# Patient Record
Sex: FEMALE | Race: WHITE | NOT HISPANIC OR LATINO | Employment: UNEMPLOYED | URBAN - METROPOLITAN AREA
[De-identification: names, ages, dates, MRNs, and addresses within clinical notes are randomized per-mention and may not be internally consistent; named-entity substitution may affect disease eponyms.]

---

## 2017-01-10 DIAGNOSIS — R56.9 CONVULSIONS, UNSPECIFIED CONVULSION TYPE (H): ICD-10-CM

## 2017-01-10 DIAGNOSIS — R56.9 SEIZURES (H): Primary | ICD-10-CM

## 2017-01-10 RX ORDER — LEVETIRACETAM 750 MG/1
TABLET ORAL
Qty: 180 TABLET | Refills: 0 | Status: SHIPPED | OUTPATIENT
Start: 2017-01-10 | End: 2018-03-20

## 2017-01-26 ENCOUNTER — OFFICE VISIT (OUTPATIENT)
Dept: NEUROLOGY | Facility: CLINIC | Age: 37
End: 2017-01-26

## 2017-01-26 VITALS — RESPIRATION RATE: 18 BRPM | HEART RATE: 107 BPM | SYSTOLIC BLOOD PRESSURE: 123 MMHG | DIASTOLIC BLOOD PRESSURE: 86 MMHG

## 2017-01-26 DIAGNOSIS — R56.9 CONVULSIONS, UNSPECIFIED CONVULSION TYPE (H): Primary | ICD-10-CM

## 2017-01-26 RX ORDER — LEVETIRACETAM 750 MG/1
750 TABLET ORAL 2 TIMES DAILY
Qty: 60 TABLET | Refills: 12 | Status: SHIPPED | OUTPATIENT
Start: 2017-01-26 | End: 2018-02-14

## 2017-01-26 ASSESSMENT — PAIN SCALES - GENERAL: PAINLEVEL: NO PAIN (0)

## 2017-01-26 NOTE — MR AVS SNAPSHOT
After Visit Summary   2017    Gabriela Juarez    MRN: [id number]           Patient Information     Date Of Birth          1980        Visit Information        Provider Department      2017 1:30 PM Juan Jackson MD [facility]        Today's Diagnoses     Convulsions, unspecified convulsion type (H)    -  1        Follow-ups after your visit        Follow-up notes from your care team     Return in about 1 year (around 2018).      Who to contact     Please call your clinic at [phone] to:    Ask questions about your health    Make or cancel appointments    Discuss your medicines    Learn about your test results    Speak to your doctor   If you have compliments or concerns about an experience at your clinic, or if you wish to file a complaint, please contact [facility] Patient Relations at [phone] or email us at [email or web address]         Additional Information About Your Visit        MyChart Information     mySocietyt is an electronic gateway that provides easy, online access to your medical records. With [employer], you can request a clinic appointment, read your test results, renew a prescription or communicate with your care team.     To sign up for mySocietyt visit the website at [email or web address]   You will be asked to enter the access code listed below, as well as some personal information. Please follow the directions to create your username and password.     Your access code is: [email or web address]  Expires: 2017  1:36 PM     Your access code will  in 90 days. If you need help or a new code, please contact your [facility] or call [phone] for assistance.        Care EveryWhere ID     This is your Care EveryWhere ID. This could be used by other organizations to access your [place] medical records  [id number]        Your Vitals Were      Pulse Respirations                107 18           Blood Pressure from Last 3 Encounters:   01/26/17 123/86   12/01/14 126/84   12/23/13 118/82    Weight from Last 3 Encounters:   No data found for Wt              Today, you had the following     No orders found for display         Where to get your medicines      These medications were sent to [facility] - [place] - [address]  [address] [facility], [place] [address]     Phone:  [phone]    - levETIRAcetam 750 MG tablet       Primary Care Provider    Melvina Balbuena       No address on file        Thank you!     Thank you for choosing [facility]  for your care. Our goal is always to provide you with excellent care. Hearing back from our patients is one way we can continue to improve our services. Please take a few minutes to complete the written survey that you may receive in the mail after your visit with us. Thank you!             Your Updated Medication List - Protect others around you: Learn how to safely use, store and throw away your medicines at www.disposemymeds.org.          This list is accurate as of: 1/26/17  1:36 PM.  Always use your most recent med list.                   Brand Name Dispense Instructions for use    B-12 PO      Take by mouth daily       calcium carbonate 500 MG tablet    OS-SHARMIN 500 mg [place]. Ca     Take 500 mg by mouth daily       CLOZAPINE PO    CLOZARIL     Take by mouth daily 150 am, 200 pm, 250 HS       * levETIRAcetam 750 MG tablet    KEPPRA    180 tablet    TAKE ONE (1) tablet BY MOUTH TWICE DAILY       * levETIRAcetam 750 MG tablet    KEPPRA    60 tablet    Take 1 tablet (750 mg) by mouth 2 times daily       lithium 300 MG capsule      Take 100 mg by mouth 3 times daily (with meals)       * Notice:  This list has 2 medication(s) that are the same as other medications prescribed for you. Read the directions carefully, and ask your  doctor or other care provider to review them with you.

## 2017-01-26 NOTE — Clinical Note
2017       RE: Gabriela Gill  [address]  [place] [address]     Dear Colleague,    Thank you for referring your patient, Gabriela Gill, to the [facility] at [facility]. Please see a copy of my visit note below.    2017      Funmilayo Balbuena NP   [facility]    [address] [place]   [address]       RE: Gabriela Gill   MRN: [id number]   : 1980      Dear Funmilayo:      I am writing to you in followup on Gabriela Gill with chief complaint of seizure disorder.  I last saw her in 2015.  She is here with her mother, Antonietta.  There has been no major change in her neurologic status since that time.  The patient is on Keppra 750 mg twice a day.  She also is on lithium and Clozaril.  She lives at [place].  She is a .  She loves [employer].  She is swimming now.  She also participates in other sporting activities.  She has no complaints.      On exam today, the patient is cooperative and in no distress.  Her blood pressure is 123/86.  There is nothing to add on neurologic exam.      ASSESSMENT:   1.  Seizure disorder, well controlled with Keppra.   2.  Psychiatric issues.   3.  Chronic encephalopathy.      DISCUSSION:  The patient is seen with the above problem list.  I am not going to make any changes in her regimen.  She gets a CBC checked every month by Dr. Spaulding because of her Clozaril use.  I have given her a refill on the Keppra.  I will see her in followup in a year.      Sincerely,       Juan Jackson MD              D: 2017 13:39   T: 2017 14:49   MT: ISELA      Name:     GABRIELA GILL   MRN:      [id number]        Account:      [id number]   :      1980           Service Date: 2017      Document: [id number]

## 2018-02-14 DIAGNOSIS — R56.9 CONVULSIONS, UNSPECIFIED CONVULSION TYPE (H): ICD-10-CM

## 2018-02-14 RX ORDER — LEVETIRACETAM 750 MG/1
TABLET ORAL
Qty: 60 TABLET | Refills: 0 | Status: SHIPPED | OUTPATIENT
Start: 2018-02-14 | End: 2018-03-20

## 2018-03-15 DIAGNOSIS — R56.9 CONVULSIONS, UNSPECIFIED CONVULSION TYPE (H): ICD-10-CM

## 2018-03-16 RX ORDER — LEVETIRACETAM 750 MG/1
TABLET ORAL
Refills: 0 | OUTPATIENT
Start: 2018-03-16

## 2018-03-20 RX ORDER — LEVETIRACETAM 750 MG/1
750 TABLET ORAL 2 TIMES DAILY
Qty: 60 TABLET | Refills: 0 | Status: SHIPPED | OUTPATIENT
Start: 2018-03-20 | End: 2018-04-10

## 2018-03-20 NOTE — TELEPHONE ENCOUNTER
----- Message from Norma Lozano sent at 3/20/2018  9:46 AM CDT -----  Regarding: Pts caregiver calling for refill of rx LevETIRAcetam 750 MG  Contact: [phone]  Pts caregiver Harrison calling for refill of rx LevETIRAcetam 750 MG. There was a request that was submitted on 3/15 but was denied due to pt not having an appt. Kushal made appt for pt on 4/10 but states that pt ran out of medication this morning and is hoping to get a refill of enough to get pt through until then.  Per Kushal, pt would appreciate a call back when the request has been submitted at [phone].    Thank you!  ~Norma    Please DO NOT send this message and/or reply back to sender. Call Center Representatives DO NOT respond to messages.

## 2018-04-10 ENCOUNTER — OFFICE VISIT (OUTPATIENT)
Dept: NEUROLOGY | Facility: CLINIC | Age: 38
End: 2018-04-10
Payer: MEDICARE

## 2018-04-10 VITALS
DIASTOLIC BLOOD PRESSURE: 80 MMHG | SYSTOLIC BLOOD PRESSURE: 125 MMHG | WEIGHT: 158.2 LBS | HEART RATE: 103 BPM | OXYGEN SATURATION: 99 %

## 2018-04-10 DIAGNOSIS — R56.9 CONVULSIONS, UNSPECIFIED CONVULSION TYPE (H): ICD-10-CM

## 2018-04-10 PROCEDURE — 99213 OFFICE O/P EST LOW 20 MIN: CPT | Performed by: PSYCHIATRY & NEUROLOGY

## 2018-04-10 RX ORDER — LEVETIRACETAM 750 MG/1
750 TABLET ORAL 2 TIMES DAILY
Qty: 60 TABLET | Refills: 11 | Status: SHIPPED | OUTPATIENT
Start: 2018-04-10 | End: 2019-04-24

## 2018-04-10 ASSESSMENT — PAIN SCALES - GENERAL: PAINLEVEL: NO PAIN (0)

## 2018-04-10 NOTE — NURSING NOTE
Gabriela Juarez's goals for this visit include: return  She requests these members of her care team be copied on today's visit information:     PCP: Melvina Balbuena    Referring Provider:  Juan Jackson MD  [address] [place] [address] [address]  [place] [address]    Chief Complaint   Patient presents with     RECHECK       Initial /80  Pulse 103  Wt 71.8 kg (158 lb 3.2 oz)  SpO2 99% There is no height or weight on file to calculate BMI.  Medication Reconciliation: complete    Do you need any medication refills at today's visit? ?

## 2018-04-10 NOTE — LETTER
4/10/2018         RE: Gabriela Gill  [address]  [place] [address]        Dear Colleague,    Thank you for referring your patient, Gabriela Gill, to the [facility]. Please see a copy of my visit note below.    Visit Date:   04/10/2018      SUBJECTIVE:  This patient is seen in followup with seizure disorder.  She is here today with her , Raymundo.  Usually, she comes with one of her parents.  In any case, I last saw the patient a little over a year ago.  She has seizure disorder not further characterized.  She has been treating with levetiracetam 750 mg twice a day.  She is doing well.  She has not had seizures in several years.  She also is on lithium and clozapine.  She lives at [place], which is near [facility] in [place].  She works as a .  She is also involved in the [employer] with walking and swimming.  They swim at the high school.  She has had a good year.  She has no complaints.      PHYSICAL EXAMINATION:   GENERAL:  The patient is cooperative and in no distress.   VITAL SIGNS:  Her blood pressure is 125/80.     Eye movements are complete and conjugate without nystagmus.  Finger-nose-finger and heel-knee-shin are normal.  She can get out of a chair without the use of her arms.  She cannot walk on her heels, toes or tandem.      ASSESSMENT:   1.  Seizure disorder.   2.  Chronic encephalopathy with psychiatric issues.      DISCUSSION:  The patient is seen with the above problem list.  She is doing well on her current regimen of levetiracetam.  I have given her a refill for 1 year.  I will see her in followup at that time.  Monthly blood counts are checked because of her use of clozapine by Dr. Spaulding.         WILLIE FITZGERALD MD             D: 04/10/2018   T: 04/10/2018   MT: RAIZA      Name:     GABRIELA GILL   MRN:      [id number]        Account:      [id number]   :      1980           Visit Date:   04/10/2018      Document: [id number]        cc: Funmilaoy Balbuena NP      Again, thank you for allowing me to participate in the care of your patient.        Sincerely,        Juan Jackson MD

## 2018-04-10 NOTE — PROGRESS NOTES
Visit Date:   04/10/2018      SUBJECTIVE:  This patient is seen in followup with seizure disorder.  She is here today with her , Raymundo.  Usually, she comes with one of her parents.  In any case, I last saw the patient a little over a year ago.  She has seizure disorder not further characterized.  She has been treating with levetiracetam 750 mg twice a day.  She is doing well.  She has not had seizures in several years.  She also is on lithium and clozapine.  She lives at [place], which is near [facility].  She works as a .  She is also involved in the [employer] with walking and swimming.  They swim at the high school.  She has had a good year.  She has no complaints.      PHYSICAL EXAMINATION:   GENERAL:  The patient is cooperative and in no distress.   VITAL SIGNS:  Her blood pressure is 125/80.     Eye movements are complete and conjugate without nystagmus.  Finger-nose-finger and heel-knee-shin are normal.  She can get out of a chair without the use of her arms.  She cannot walk on her heels, toes or tandem.      ASSESSMENT:   1.  Seizure disorder.   2.  Chronic encephalopathy with psychiatric issues.      DISCUSSION:  The patient is seen with the above problem list.  She is doing well on her current regimen of levetiracetam.  I have given her a refill for 1 year.  I will see her in followup at that time.  Monthly blood counts are checked because of her use of clozapine by Dr. Spaulding.         WILLIE FITZGERALD MD             D: 04/10/2018   T: 04/10/2018   MT: RAIZA      Name:     DAVID GILL   MRN:      [id number]        Account:      [id number]   :      1980           Visit Date:   04/10/2018      Document: [id number]       cc: Funmilayo Balbuena NP

## 2018-04-10 NOTE — MR AVS SNAPSHOT
After Visit Summary   4/10/2018    Gabriela Juarez    MRN: [id number]           Patient Information     Date Of Birth          1980        Visit Information        Provider Department      4/10/2018 1:30 PM Juan Jackson MD [facility]        Today's Diagnoses     Convulsions, unspecified convulsion type (H)           Follow-ups after your visit        Follow-up notes from your care team     Return in about 1 year (around 4/10/2019).      Your next 10 appointments already scheduled     Apr 10, 2019  2:00 PM CDT   Return Visit with Juan Jackson MD   [facility]    [address] [place] [id number] [phone]              Who to contact     If you have questions or need follow up information about today's clinic visit or your schedule please contact [facility] directly at [phone].  Normal or non-critical lab and imaging results will be communicated to you by MyChart, letter or phone within 4 business days after the clinic has received the results. If you do not hear from us within 7 days, please contact the clinic through [employer] or phone. If you have a critical or abnormal lab result, we will notify you by phone as soon as possible.  Submit refill requests through [employer] or call your pharmacy and they will forward the refill request to us. Please allow 3 business days for your refill to be completed.          Additional Information About Your Visit        MyChart Information     [employer] is an electronic gateway that provides easy, online access to your medical records. With [employer], you can request a clinic appointment, read your test results, renew a prescription or communicate with your care team.     To sign up for [employer] visit the website at [email or web address]   You will be asked to enter the access code listed below, as well as some personal information. Please  follow the directions to create your username and password.     Your access code is: [email or web address]  Expires: 2018  2:00 PM     Your access code will  in 90 days. If you need help or a new code, please contact your [facility] or call [phone] for assistance.        Care EveryWhere ID     This is your Care EveryWhere ID. This could be used by other organizations to access your [place] medical records  [id number]        Your Vitals Were     Pulse Pulse Oximetry                103 99%           Blood Pressure from Last 3 Encounters:   04/10/18 125/80   17 123/86   14 126/84    Weight from Last 3 Encounters:   04/10/18 71.8 kg (158 lb 3.2 oz)              Today, you had the following     No orders found for display         Where to get your medicines      These medications were sent to [facility] - [place], [place] - [address]  [facility] [address], [facility] [address]     Phone:  [phone]     levETIRAcetam 750 MG tablet          Primary Care Provider    Melvina Balbuena       No address on file        Equal Access to Services     [facility] Center[facility]UDE : Hadii aad ku hadasho Soomaali, waaxda luqadaha, qaybta kaalmada adeegyada, cassidy hawkins haymarvin guerrero . [facility] [phone].    ATENCIÓN: Si habla español, tiene a crow disposición servicios gratuitos de asistencia lingüística. [facility] [phone].    We comply with applicable federal civil rights laws and [place] laws. We do not discriminate on the basis of race, color, national origin, age, disability, sex, sexual orientation, or gender identity.            Thank you!     Thank you for choosing [facility]  for your care. Our goal is always to provide you with excellent care. Hearing back from our patients is one way we can continue to improve our services. Please take a few minutes to complete the written survey that you may receive in the mail after your visit with us.  Thank you!             Your Updated Medication List - Protect others around you: Learn how to safely use, store and throw away your medicines at www.disposemymeds.org.          This list is accurate as of 4/10/18  2:00 PM.  Always use your most recent med list.                   Brand Name Dispense Instructions for use Diagnosis    B-12 PO      Take by mouth daily        calcium carbonate 1250 MG tablet    OS-SHARMIN 500 mg [place]. Ca     Take 500 mg by mouth daily        CLOZAPINE PO    CLOZARIL     Take 100 mg by mouth daily 1 every am ,2 tabs at 4:30 and 2 tabs at hs        FLOVENT IN      Inhale 1 puff into the lungs 2 times daily        levETIRAcetam 750 MG tablet    KEPPRA    60 tablet    Take 1 tablet (750 mg) by mouth 2 times daily    Convulsions, unspecified convulsion type (H)       LITHIUM PO      Take 100 mg by mouth 3 times daily

## 2019-04-24 ENCOUNTER — OFFICE VISIT (OUTPATIENT)
Dept: NEUROLOGY | Facility: CLINIC | Age: 39
End: 2019-04-24
Payer: MEDICARE

## 2019-04-24 VITALS
HEART RATE: 95 BPM | TEMPERATURE: 97.6 F | WEIGHT: 155.3 LBS | SYSTOLIC BLOOD PRESSURE: 141 MMHG | DIASTOLIC BLOOD PRESSURE: 85 MMHG | RESPIRATION RATE: 18 BRPM | OXYGEN SATURATION: 99 %

## 2019-04-24 DIAGNOSIS — R56.9 CONVULSIONS, UNSPECIFIED CONVULSION TYPE (H): ICD-10-CM

## 2019-04-24 PROCEDURE — 99213 OFFICE O/P EST LOW 20 MIN: CPT | Performed by: PSYCHIATRY & NEUROLOGY

## 2019-04-24 RX ORDER — LEVETIRACETAM 750 MG/1
750 TABLET ORAL 2 TIMES DAILY
Qty: 60 TABLET | Refills: 11 | Status: SHIPPED | OUTPATIENT
Start: 2019-04-24 | End: 2020-03-16

## 2019-04-24 ASSESSMENT — PAIN SCALES - GENERAL: PAINLEVEL: NO PAIN (0)

## 2019-04-24 NOTE — PROGRESS NOTES
Visit Date:   2019      NEUROLOGY CONSULTATION    HISTORY OF PRESENT ILLNESS:  This patient is seen in followup with seizure disorder and chronic encephalopathy.  She is here with her , Anushka.  I last saw the patient a year ago.  There has been no major change in her neurologic status.  She has not had any seizures.  She continues to work at [employer].  She is a  and also helps out in the lunch room.  She walks for exercise when the weather is a pleasant.  She swims on Sundays.  She also does [employer] track and field.  She has no complaints.  There has been no major change in her neurologic status.      PHYSICAL EXAMINATION:   GENERAL:  The patient is cooperative and in no distress.   VITAL SIGNS:  Her blood pressure is 141/85.   NEUROLOGIC:  She easily ambulates about the room but cannot walk on her heels, toes or tandem due to issues with imbalance.     ASSESSMENT: Seizure disorder; chronic encephalopathy     She does get labs performed periodically.  A CBC performed within the last month was normal, as well as a liver panel.  These labs are routinely checked by Psychiatry because of her use of clozapine.      Refill for levetiracetam was given for 1 year.  She will be seen in followup at that time.  They know to call if there are questions or problems.         WILLIE FITZGERALD MD             D: 2019   T: 2019   MT: AS      Name:     DAVID GILL   MRN:      [id number]        Account:      [id number]   :      1980           Visit Date:   2019      Document: [id number]

## 2019-04-24 NOTE — NURSING NOTE
Gabriela Juarez's goals for this visit include:   Chief Complaint   Patient presents with     RECHECK     1 yr recheck       She requests these members of her care team be copied on today's visit information: Yes    PCP: Melvina Balbuena    Referring Provider:  Juan Jackson MD  [address] [facility] [address]  [place] [address]    /85 (BP Location: Left arm, Patient Position: Sitting, Cuff Size: Adult Large)   Pulse 95   Temp 97.6  F (36.4  C) (Oral)   Resp 18   Wt 70.4 kg (155 lb 4.8 oz)   SpO2 99%     Do you need any medication refills at today's visit? Yes    Jersey Duarte CMA ([facility])

## 2020-03-16 DIAGNOSIS — R56.9 CONVULSIONS, UNSPECIFIED CONVULSION TYPE (H): ICD-10-CM

## 2020-03-16 RX ORDER — LEVETIRACETAM 750 MG/1
750 TABLET ORAL 2 TIMES DAILY
Qty: 60 TABLET | Refills: 11 | Status: SHIPPED | OUTPATIENT
Start: 2020-03-16 | End: 2020-05-07

## 2020-04-20 ENCOUNTER — TELEPHONE (OUTPATIENT)
Dept: NEUROLOGY | Facility: CLINIC | Age: 40
End: 2020-04-20

## 2020-05-07 ENCOUNTER — VIRTUAL VISIT (OUTPATIENT)
Dept: NEUROLOGY | Facility: CLINIC | Age: 40
End: 2020-05-07
Payer: MEDICARE

## 2020-05-07 DIAGNOSIS — R56.9 CONVULSIONS, UNSPECIFIED CONVULSION TYPE (H): ICD-10-CM

## 2020-05-07 PROCEDURE — 99207 ZZC NO CHARGE LOS: CPT | Performed by: PSYCHIATRY & NEUROLOGY

## 2020-05-07 RX ORDER — LEVETIRACETAM 750 MG/1
750 TABLET ORAL 2 TIMES DAILY
Qty: 60 TABLET | Refills: 11 | Status: SHIPPED | OUTPATIENT
Start: 2020-05-07 | End: 2021-04-21

## 2020-05-07 NOTE — PROGRESS NOTES
Visit Date:   2020      This is a telephone followup visit because of the virus epidemic.  The call was mostly handled by her , Anushka.        HISTORY OF PRESENT ILLNESS:  The patient was last seen a year ago.  She has a history of seizures.  She has been treated with levetiracetam for many years.  Her current dose is 750 mg twice a day.  She has been on this dose for several years.  A levetiracetam level was checked in 2016 on this current dose and it was 44 (12-46).      She is doing well.  Her attitude and mood are excellent.  She is more active than she has been.  There are no complaints.  She has not had any seizures.      PHYSICAL EXAMINATION:  There is no examination because of this being a telephone visit.      ASSESSMENT:   1.  Seizure disorder, quiescent with Keppra.   2.  Chronic encephalopathy.   3.  Psychiatric issues.      DISCUSSION:  The patient had a followup visit with the above problem list.  A refill on levetiracetam was given for a year.  Labs have been checked through her primary clinic.  A CBC in March showed a slightly low red blood count with a hemoglobin of 11 and a normal white blood count.  A liver panel in 2019 showed a normal ALT and bilirubin.  Followup will be in 1 year, or sooner if there are problems.         WILLIE FITZGERALD MD             D: 2020   T: 2020   MT: TERRI      Name:     DAVID GILL   MRN:      [id number]        Account:      [id number]   :      1980           Visit Date:   2020      Document: [id number]

## 2021-01-28 ENCOUNTER — VIRTUAL VISIT (OUTPATIENT)
Dept: NEUROLOGY | Facility: CLINIC | Age: 41
End: 2021-01-28
Payer: MEDICARE

## 2021-01-28 DIAGNOSIS — R56.9 CONVULSIONS, UNSPECIFIED CONVULSION TYPE (H): Primary | ICD-10-CM

## 2021-01-28 PROCEDURE — 99215 OFFICE O/P EST HI 40 MIN: CPT | Mod: 95 | Performed by: INTERNAL MEDICINE

## 2021-01-28 NOTE — PROGRESS NOTES
Gabriela is a 40 year old who is being evaluated via a billable video visit.      How would you like to obtain your AVS? MyChart  If the video visit is dropped, the invitation should be resent by: Send to e-mail at: No e-mail address on record  Will anyone else be joining your video visit? No      Video Start Time: 9:31 AM    Video-Visit Details    Type of service:  Video Visit    Video End Time: 9:43 AM    Originating Location (pt. Location): Home    Distant Location (provider location):  [facility] [place]     Platform used for Video Visit: LilaBrandBacker

## 2021-01-28 NOTE — PROGRESS NOTES
[facility] Neurology Follow-up Visit    Gabriela Juarez MRN# [id number]   Age: 40 year old YOB: 1980     Requesting physician: No ref. provider found  Melvina Balbuena     Reason for Consultation: seizures      History of Presenting Symptoms:   Gabriela Juarez is a 40 year old female who presents today for follow-up of epilepsy.     Patient is accompanied by house leader Lewis today. Patient previously followed with Dr Jackson. She was last seen in 5/2020.     Patient has longstanding history of epilepsy. History is limited due to patient's chronic cognitive deficits and lack of collateral history. House leader Joshua has only known patient for the last 2 months. Patient continues on Keppra 750 mg BID. She hasn't had any seizures since last visit. Patient can't remember when the last seizure she had was, but she think it has been more than 10 years. She is unsure when the seizures first started. She thinks some of her seizures in the past involved facial twitching. She is unsure if she has had a seizure in the past where she lost consciousness with generalized shaking.     Earliest documentation from Dr Jackson that is available is from 2018. At that visit she was also on Keppra 750 mg BID. No seizures have been documented since that visit.       Past Medical History:     Patient Active Problem List   Diagnosis     Seizures (H)     No past medical history on file.     Past Surgical History:   No past surgical history on file.     Social History:     Social History     Tobacco Use     Smoking status: Never Smoker     Smokeless tobacco: Never Used   Substance Use Topics     Alcohol use: No     Drug use: Not on file        Family History:   No family history on file.     Medications:     Current Outpatient Medications   Medication Sig     calcium carbonate (OS-SHARMIN 500 MG [place]. [place]) 500 MG tablet Take 500 mg by mouth daily     CLOZAPINE PO Take 100 mg by mouth daily 1 every am ,2 tabs at 4:30 and 2 tabs at hs      Cyanocobalamin (B-12 PO) Take by mouth daily     Fluticasone Propionate, Inhal, (FLOVENT IN) Inhale 1 puff into the lungs 2 times daily     levETIRAcetam (KEPPRA) 750 MG tablet Take 1 tablet (750 mg) by mouth 2 times daily     LITHIUM PO Take 100 mg by mouth 3 times daily     magnesium oxide 400 MG CAPS Take 1 capsule by mouth daily     No current facility-administered medications for this visit.         Allergies:   No Known Allergies     Review of Systems:   As above     Physical Exam:   Vitals: There were no vitals taken for this visit.   General: Seated comfortably in no acute distress.  Neurologic:     Mental Status: Fully alert, attentive. Able to count backwards from 10-1. Oriented to person, place, and day of week. Said it was February and the year was 2020. Impaired remote memory and fund of knowledge when asked particular questions about medical history. Language normal, speech clear and fluent, no paraphasic errors.     Cranial Nerves: EOMI with normal smooth pursuit. Facial movements symmetric. Hearing not formally tested but intact to conversation.  No dysarthria.     Motor: No tremors or other abnormal movements observed.      Sensory:Negative Romberg.      Coordination: Finger-nose-finger without dysmetria.     Gait: Normal, steady casual gait.         Data: Pertinent prior to visit   Imaging:  MRI brain 2005   IMPRESSION:    Normal MRI of the brain.     Procedures:  EEG 2007   CONCLUSION: This is an abnormal routine EEG in a 26-year-old female   patient during wakefulness and drowsiness. There were frequent   intermittent bifrontal spikes and slow waves throughout the study which   were not increased in frequency during photic stimulation. This finding is   consistent with a seizure disorder.    Laboratory:  None         Assessment and Plan:   Assessment:  Gabriela Juarez is a 40 year old female who presents today for follow-up of epilepsy. Patient previously followed with Dr Jackson. She was last seen  in 5/2020. Unclear from history when seizures first started, but patient doesn't think she's had a seizure in at least 10 years. Previous EEG from 2007 showed intermittent bifrontal spikes. MRI brain was normal in 2005. She is doing well on Keppra 750 mg BID. We discussed continuing current dose of Keppra and following up in 1 year.     Follow up in Neurology clinic in 1 year or earlier as needed should new concerns arise.    Santosh Seals MD   of Neurology  [facility]    The total time of this encounter amounted to 40 minutes. This time included time spent with the patient, prep work, ordering tests, and performing post visit documentation.

## 2021-01-28 NOTE — LETTER
1/28/2021         RE: Gabriela Juarez  [address] [facility] [address]        Dear Colleague,    Thank you for referring your patient, Gabriela Juarez, to the [facility] NEUROLOGY CLINIC [place]. Please see a copy of my visit note below.    [facility] Neurology Follow-up Visit    Gabriela Juarez MRN# [id number]   Age: 40 year old YOB: 1980     Requesting physician: No ref. provider found  Melvina Balbuena     Reason for Consultation: seizures      History of Presenting Symptoms:   Gabriela Juarez is a 40 year old female who presents today for follow-up of epilepsy.     Patient is accompanied by house leader Lewis today. Patient previously followed with Dr Jackson. She was last seen in 5/2020.     Patient has longstanding history of epilepsy. History is limited due to patient's chronic cognitive deficits and lack of collateral history. House leader Lewis has only known patient for the last 2 months. Patient continues on Keppra 750 mg BID. She hasn't had any seizures since last visit. Patient can't remember when the last seizure she had was, but she think it has been more than 10 years. She is unsure when the seizures first started. She thinks some of her seizures in the past involved facial twitching. She is unsure if she has had a seizure in the past where she lost consciousness with generalized shaking.     Earliest documentation from Dr Jackson that is available is from 2018. At that visit she was also on Keppra 750 mg BID. No seizures have been documented since that visit.       Past Medical History:     Patient Active Problem List   Diagnosis     Seizures (H)     No past medical history on file.     Past Surgical History:   No past surgical history on file.     Social History:     Social History     Tobacco Use     Smoking status: Never Smoker     Smokeless tobacco: Never Used   Substance Use Topics     Alcohol use: No     Drug use: Not on file        Family History:   No family history on  file.     Medications:     Current Outpatient Medications   Medication Sig     calcium carbonate (OS-SHARMIN 500 MG [place]. [place]) 500 MG tablet Take 500 mg by mouth daily     CLOZAPINE PO Take 100 mg by mouth daily 1 every am ,2 tabs at 4:30 and 2 tabs at hs     Cyanocobalamin (B-12 PO) Take by mouth daily     Fluticasone Propionate, Inhal, (FLOVENT IN) Inhale 1 puff into the lungs 2 times daily     levETIRAcetam (KEPPRA) 750 MG tablet Take 1 tablet (750 mg) by mouth 2 times daily     LITHIUM PO Take 100 mg by mouth 3 times daily     magnesium oxide 400 MG CAPS Take 1 capsule by mouth daily     No current facility-administered medications for this visit.         Allergies:   No Known Allergies     Review of Systems:   As above     Physical Exam:   Vitals: There were no vitals taken for this visit.   General: Seated comfortably in no acute distress.  Neurologic:     Mental Status: Fully alert, attentive. Able to count backwards from 10-1. Oriented to person, place, and day of week. Said it was February and the year was 2020. Impaired remote memory and fund of knowledge when asked particular questions about medical history. Language normal, speech clear and fluent, no paraphasic errors.     Cranial Nerves: EOMI with normal smooth pursuit. Facial movements symmetric. Hearing not formally tested but intact to conversation.  No dysarthria.     Motor: No tremors or other abnormal movements observed.      Sensory:Negative Romberg.      Coordination: Finger-nose-finger without dysmetria.     Gait: Normal, steady casual gait.         Data: Pertinent prior to visit   Imaging:  MRI brain 2005   IMPRESSION:    Normal MRI of the brain.     Procedures:  EEG 2007   CONCLUSION: This is an abnormal routine EEG in a 26-year-old female   patient during wakefulness and drowsiness. There were frequent   intermittent bifrontal spikes and slow waves throughout the study which   were not increased in frequency during photic stimulation. This  finding is   consistent with a seizure disorder.    Laboratory:  None         Assessment and Plan:   Assessment:  Gabriela Juarez is a 40 year old female who presents today for follow-up of epilepsy. Patient previously followed with Dr Jackson. She was last seen in 5/2020. Unclear from history when seizures first started, but patient doesn't think she's had a seizure in at least 10 years. Previous EEG from 2007 showed intermittent bifrontal spikes. MRI brain was normal in 2005. She is doing well on Keppra 750 mg BID. We discussed continuing current dose of Keppra and following up in 1 year.     Follow up in Neurology clinic in 1 year or earlier as needed should new concerns arise.    Santosh Seals MD   of Neurology  [facility]    The total time of this encounter amounted to 40 minutes. This time included time spent with the patient, prep work, ordering tests, and performing post visit documentation.      Gabriela is a 40 year old who is being evaluated via a billable video visit.      How would you like to obtain your AVS? MyChart  If the video visit is dropped, the invitation should be resent by: Send to e-mail at: No e-mail address on record  Will anyone else be joining your video visit? No      Video Start Time: 9:31 AM    Video-Visit Details    Type of service:  Video Visit    Video End Time: 9:43 AM    Originating Location (pt. Location): Home    Distant Location (provider location):  [facility] NEUROLOGY CLINIC [place]     Platform used for Video Visit: Elise        Again, thank you for allowing me to participate in the care of your patient.        Sincerely,        Santosh Seals MD

## 2021-04-21 DIAGNOSIS — R56.9 CONVULSIONS, UNSPECIFIED CONVULSION TYPE (H): ICD-10-CM

## 2021-04-21 RX ORDER — LEVETIRACETAM 750 MG/1
750 TABLET ORAL 2 TIMES DAILY
Qty: 60 TABLET | Refills: 11 | Status: SHIPPED | OUTPATIENT
Start: 2021-04-21 | End: 2022-01-13

## 2021-04-21 NOTE — TELEPHONE ENCOUNTER
Rx Authorization:    Requested Medication/ Dose levETIRAcetam 750 MG TABS 750 Tablet    Date last refill ordered: 5/7/2020    Quantity ordered: 60 tabs    # refills: 11    Date of last clinic visit with ordering provider: 1/28/21    Date of next clinic visit with ordering provider:     All pertinent protocol data (lab date/result):     Include pertinent information from patients message:

## 2022-01-13 ENCOUNTER — VIRTUAL VISIT (OUTPATIENT)
Dept: NEUROLOGY | Facility: CLINIC | Age: 42
End: 2022-01-13
Payer: MEDICARE

## 2022-01-13 DIAGNOSIS — R56.9 CONVULSIONS, UNSPECIFIED CONVULSION TYPE (H): ICD-10-CM

## 2022-01-13 PROCEDURE — 99213 OFFICE O/P EST LOW 20 MIN: CPT | Mod: 95 | Performed by: INTERNAL MEDICINE

## 2022-01-13 RX ORDER — LEVETIRACETAM 750 MG/1
750 TABLET ORAL 2 TIMES DAILY
Qty: 180 TABLET | Refills: 3 | Status: SHIPPED | OUTPATIENT
Start: 2022-01-13 | End: 2023-01-26

## 2022-01-13 NOTE — PROGRESS NOTES
Gabriela is a 41 year old who is being evaluated via a billable video visit.      How would you like to obtain your AVS? Mail a copy  If the video visit is dropped, the invitation should be resent by: Send to e-mail at: [email or web address]  Will anyone else be joining your video visit? No      Video-Visit Details    Total video time: 5 minutes      [facility] Neurology Follow Up Visit    Gabriela Juarez MRN# [id number]   Age: 41 year old YOB: 1980     Brief history of symptoms: The patient was initially seen in neurologic consultation on 1/28/2021 for evaluation of epilepsy. Please see the comprehensive neurologic consultation note from that date in the Epic records for details.     Interval history:   Gabriela has been doing well since last visit. She has not had any clear seizures. She continues on the Keppra 750 mg BID.     She had one episode in March 2021 when she became tired with walking. She fell to the ground and it was unclear why. However she was responsive the whole time and did not have any abnormal movements.       Past Medical History:     Patient Active Problem List   Diagnosis     Seizures (H)     No past medical history on file.     Past Surgical History:   No past surgical history on file.     Social History:     Social History     Tobacco Use     Smoking status: Never Smoker     Smokeless tobacco: Never Used   Substance Use Topics     Alcohol use: No     Drug use: Not on file        Family History:   No family history on file.     Medications:     Current Outpatient Medications   Medication Sig     calcium carbonate (OS-SHARMIN 500 MG [place]. [place]) 500 MG tablet Take 500 mg by mouth daily     CLOZAPINE PO Take 100 mg by mouth daily 1 every am ,2 tabs at 4:30 and 2 tabs at hs     Cyanocobalamin (B-12 PO) Take by mouth daily     Fluticasone Propionate, Inhal, (FLOVENT IN) Inhale 1 puff into the lungs 2 times daily     levETIRAcetam (KEPPRA) 750 MG tablet TAKE 1 TABLET (750 MG) BY MOUTH 2 TIMES  DAILY     LITHIUM PO Take 100 mg by mouth 3 times daily     magnesium oxide 400 MG CAPS Take 1 capsule by mouth daily     No current facility-administered medications for this visit.        Allergies:   No Known Allergies     Review of Systems:   As above     Physical Exam:   Vitals: There were no vitals taken for this visit.   No examination given nature of virtual visit     Data reviewed on previous visits    Imaging:  MRI brain 2005   IMPRESSION:    Normal MRI of the brain.      Procedures:  EEG 2007   CONCLUSION: This is an abnormal routine EEG in a 26-year-old female   patient during wakefulness and drowsiness. There were frequent   intermittent bifrontal spikes and slow waves throughout the study which   were not increased in frequency during photic stimulation. This finding is   consistent with a seizure disorder.    Pertinent Investigations since last visit:   None         Assessment and Plan:   Assessment:  Gabriela Juarez is a 40 year old female who presents today for follow-up of epilepsy. She was initially seen in January 2021. Patient previously followed with Dr Jackson. Unclear from history when seizures first started, but patient hasn't had a seizure in at least 10 years. Previous EEG from 2007 showed intermittent bifrontal spikes. MRI brain was normal in 2005. She is doing well on Keppra 750 mg BID. We discussed continuing current dose of Keppra and following up in 1 year.       Plan:  - Continue Keppra 750 mg BID    Follow up in Neurology clinic in 1 year or earlier as needed should new concerns arise.    Santosh Seals MD   of Neurology  [facility]

## 2022-01-13 NOTE — LETTER
1/13/2022         RE: Gabriela Juarez  [address] [place] [facility] [address]        Dear Colleague,    Thank you for referring your patient, Gabriela Juarez, to the [facility] NEUROLOGY CLINIC [place]. Please see a copy of my visit note below.    Gabriela is a 41 year old who is being evaluated via a billable video visit.      How would you like to obtain your AVS? Mail a copy  If the video visit is dropped, the invitation should be resent by: Send to e-mail at: [email or web address]  Will anyone else be joining your video visit? No      Video-Visit Details    Total video time: 5 minutes      [facility] Neurology Follow Up Visit    Gabriela Juarez MRN# [id number]   Age: 41 year old YOB: 1980     Brief history of symptoms: The patient was initially seen in neurologic consultation on 1/28/2021 for evaluation of epilepsy. Please see the comprehensive neurologic consultation note from that date in the Epic records for details.     Interval history:   Gabriela has been doing well since last visit. She has not had any clear seizures. She continues on the Keppra 750 mg BID.     She had one episode in March 2021 when she became tired with walking. She fell to the ground and it was unclear why. However she was responsive the whole time and did not have any abnormal movements.       Past Medical History:     Patient Active Problem List   Diagnosis     Seizures (H)     No past medical history on file.     Past Surgical History:   No past surgical history on file.     Social History:     Social History     Tobacco Use     Smoking status: Never Smoker     Smokeless tobacco: Never Used   Substance Use Topics     Alcohol use: No     Drug use: Not on file        Family History:   No family history on file.     Medications:     Current Outpatient Medications   Medication Sig     calcium carbonate (OS-SHARMIN 500 MG [place]. [place]) 500 MG tablet Take 500 mg by mouth daily     CLOZAPINE PO Take 100 mg by mouth daily 1 every  am ,2 tabs at 4:30 and 2 tabs at hs     Cyanocobalamin (B-12 PO) Take by mouth daily     Fluticasone Propionate, Inhal, (FLOVENT IN) Inhale 1 puff into the lungs 2 times daily     levETIRAcetam (KEPPRA) 750 MG tablet TAKE 1 TABLET (750 MG) BY MOUTH 2 TIMES DAILY     LITHIUM PO Take 100 mg by mouth 3 times daily     magnesium oxide 400 MG CAPS Take 1 capsule by mouth daily     No current facility-administered medications for this visit.        Allergies:   No Known Allergies     Review of Systems:   As above     Physical Exam:   Vitals: There were no vitals taken for this visit.   No examination given nature of virtual visit     Data reviewed on previous visits    Imaging:  MRI brain 2005   IMPRESSION:    Normal MRI of the brain.      Procedures:  EEG 2007   CONCLUSION: This is an abnormal routine EEG in a 26-year-old female   patient during wakefulness and drowsiness. There were frequent   intermittent bifrontal spikes and slow waves throughout the study which   were not increased in frequency during photic stimulation. This finding is   consistent with a seizure disorder.    Pertinent Investigations since last visit:   None         Assessment and Plan:   Assessment:  Gabriela Juarez is a 40 year old female who presents today for follow-up of epilepsy. She was initially seen in January 2021. Patient previously followed with Dr Jackson. Unclear from history when seizures first started, but patient hasn't had a seizure in at least 10 years. Previous EEG from 2007 showed intermittent bifrontal spikes. MRI brain was normal in 2005. She is doing well on Keppra 750 mg BID. We discussed continuing current dose of Keppra and following up in 1 year.       Plan:  - Continue Keppra 750 mg BID    Follow up in Neurology clinic in 1 year or earlier as needed should new concerns arise.    Santosh Seals MD   of Neurology  [facility]        Again, thank you for allowing me to participate in the care of  your patient.        Sincerely,        Santosh Seals MD

## 2022-05-26 ENCOUNTER — TELEPHONE (OUTPATIENT)
Dept: NEUROLOGY | Facility: CLINIC | Age: 42
End: 2022-05-26
Payer: MEDICARE

## 2022-05-26 NOTE — TELEPHONE ENCOUNTER
5/26 Provided phone number [phone] to schedule follow up  about 1 year (around 1/13/2023) for using a video visit.    Rosette castaneda Procedure   Orthopedics, Podiatry, Sports Medicine, ENT/Eye Specialties  [facility]   [phone]

## 2022-07-05 NOTE — TELEPHONE ENCOUNTER
2nd attempt to schedule an appointment with Dr. Seals for January 2023.    Writer dontrell.    [facility]   Neurology, NeuroSurgery, NeuroPsychology and Pain Management Specialties  [phone]

## 2023-01-17 ENCOUNTER — VIRTUAL VISIT (OUTPATIENT)
Dept: NEUROLOGY | Facility: CLINIC | Age: 43
End: 2023-01-17
Payer: MEDICARE

## 2023-01-17 DIAGNOSIS — Z53.9 ERRONEOUS ENCOUNTER--DISREGARD: Primary | ICD-10-CM

## 2023-01-17 NOTE — LETTER
1/17/2023         RE: Gabriela Juarez  [address] [place] [address]        Dear Colleague,    Thank you for referring your patient, Gabriela Juarez, to the [facility] NEUROLOGY CLINIC [place]. Please see a copy of my visit note below.    Opened in error.            Again, thank you for allowing me to participate in the care of your patient.        Sincerely,        Santosh Seals MD

## 2023-01-24 DIAGNOSIS — R56.9 CONVULSIONS, UNSPECIFIED CONVULSION TYPE (H): ICD-10-CM

## 2023-01-24 NOTE — TELEPHONE ENCOUNTER
Pending Prescriptions:                       Disp   Refills    levETIRAcetam (KEPPRA) 750 MG tablet      180 ta*3            Sig: Take 1 tablet (750 mg) by mouth 2 times daily      Requested Pharmacy: [facility]    Pt's last office visit: 01/13/2022  Next scheduled office visit: NO SHOW 01/17/2023-LM FOR PT TO CB AND RESCHEDULE  B4 REFILLS WILL BE GRANTED  Follow-up scheduled for Wed 06/28/2023 at 1130am.      Per the RN/LPN medication refill protocol, writer is unable to refill this request.     Martita Virgen LPN

## 2023-01-26 ENCOUNTER — TELEPHONE (OUTPATIENT)
Dept: NEUROLOGY | Facility: CLINIC | Age: 43
End: 2023-01-26
Payer: MEDICARE

## 2023-01-26 RX ORDER — LEVETIRACETAM 750 MG/1
750 TABLET ORAL 2 TIMES DAILY
Qty: 180 TABLET | Refills: 3 | Status: SHIPPED | OUTPATIENT
Start: 2023-01-26 | End: 2023-02-08

## 2023-01-26 NOTE — TELEPHONE ENCOUNTER
[facility] Call Center    Phone Message    May a detailed message be left on voicemail: yes     Reason for Call: Other: Patient missed their video appt in January and needs to reschedule with Dr. Seals. Writer unable to schedule, video return template unavailable for both [facility]. Please contact group home [facility] to schedule at [phone].    Action Taken: Message routed to:  Other: neurology    Travel Screening: Not Applicable

## 2023-01-26 NOTE — TELEPHONE ENCOUNTER
I called Group home and Spoke with Kristal. We schedule video Follow-up for Wed 06/28/2023 at 1130am.  Martita Virgen LPN

## 2023-01-31 ENCOUNTER — TELEPHONE (OUTPATIENT)
Dept: NEUROLOGY | Facility: CLINIC | Age: 43
End: 2023-01-31

## 2023-01-31 NOTE — TELEPHONE ENCOUNTER
Discussed care with PA Trace at outside ER. Patient had a seizure this morning described as a generalized tonic clonic seizure lasting about 1 minute. When she came to the ER she was back at baseline mentally, but having full body brief jerks several times a minute. She was loaded with 1500 mg Keppra and the jerks appear to be improving. At baseline she has rare body jerks. We discussed increasing Keppra to 1 g BID for maintenance and discharge given she is returning to baseline. Patient will follow-up in outpatient clinic.     Santosh Seals MD

## 2023-01-31 NOTE — TELEPHONE ENCOUNTER
[facility] Call Center    Phone Message    May a detailed message be left on voicemail: yes     Reason for Call: Other: Trace (PA) at the ER [facility] is calling because pt had a seizure this morning. Pt has been compliant with her meds but now she is having some myoclonus activity and sort of repetitively. Pablo says he did speak to a neurologist in [place] but they suggested he speak with Dr. Seals. Please give Pablo a call back asap. His cell phone number is [phone]    Action Taken: Message routed to:  Adult Clinics: Neurology p [id number]    Travel Screening: Not Applicable

## 2023-02-07 ENCOUNTER — TELEPHONE (OUTPATIENT)
Dept: NEUROLOGY | Facility: CLINIC | Age: 43
End: 2023-02-07

## 2023-02-07 DIAGNOSIS — R56.9 CONVULSIONS, UNSPECIFIED CONVULSION TYPE (H): ICD-10-CM

## 2023-02-07 NOTE — CONFIDENTIAL NOTE
RN spoke to the pt's caregiver regarding her symptoms. The pt was recently in the ED for a seizure and her Keppra dose was increased to 1000mg twice daily (from 750mg twice daily). Her Clozapine and Lithium doses were verified.   She states that Gabriela is her normal self in the morning but by the afternoon she often finds her falling asleep and increasingly more groggy. She is wondering if it could be because of the increase in Levetiracetam.     He denies any further seizures.     She was scheduled to return to see Dr Seals in May (last appt 1/13/22) but was able to get her in sooner on 2/22/23.      Encounter routed to Dr Seals to review and advise.     Annalise Gonzalez, RN Care Coordinator   Neurology/Neurosurgery/PM&R/ Pain Management

## 2023-02-07 NOTE — TELEPHONE ENCOUNTER
[facility] Call Center    Phone Message    May a detailed message be left on voicemail: yes     Reason for Call: Medication Question or concern regarding medication      Kristal pt's caregiver is wondering if pt's reaction to the increas of Keppra is noramal? She states the dosage was increased by 500 MG. Pt also takes 200 mg CLOZAPINE PO and LITHIUM PO at the same time as the Keppra. Kristal states pt gets groggy  and sleepy. When they took the pt to ER she thought she had taken her morning Keppra but she didn't take it she missed it the day she had the seizer would this effect the dosage?      Please follow up with patient.  Phone number to reach patient:  Other phone number:  [phone] (Kristal's number she is pt's care giver)    Action Taken: Message routed to: [facility]    Travel Screening: Not Applicable    Naveed Valerio on 2/7/2023 at 1:40 PM  Neurology Intake Representative

## 2023-02-08 RX ORDER — LEVETIRACETAM 750 MG/1
750 TABLET ORAL EVERY MORNING
Qty: 90 TABLET | Refills: 3 | Status: SHIPPED | OUTPATIENT
Start: 2023-02-08 | End: 2023-02-22

## 2023-02-08 RX ORDER — LEVETIRACETAM 1000 MG/1
1000 TABLET ORAL EVERY EVENING
Qty: 90 TABLET | Refills: 3 | Status: SHIPPED | OUTPATIENT
Start: 2023-02-08 | End: 2023-02-22

## 2023-02-08 NOTE — CONFIDENTIAL NOTE
Santosh Seals MD You 3 hours ago (8:30 AM)   JM   We could try decreasing the morning dosage back to 750 mg and continuing the increased dose at 1000 mg. If this doesn't improve drowsiness we could consider also decreasing back to 750 mg BID, as this was her first seizure in 10 years. Let me know if I need to send new scripts for this.

## 2023-02-08 NOTE — CONFIDENTIAL NOTE
Prescriptions pended and routed to Dr Seals to review and sign.     Annalise Gonzalez, RN Care Coordinator   Neurology/Neurosurgery/PM&R/ Pain Management

## 2023-02-22 ENCOUNTER — VIRTUAL VISIT (OUTPATIENT)
Dept: NEUROLOGY | Facility: CLINIC | Age: 43
End: 2023-02-22
Payer: MEDICARE

## 2023-02-22 ENCOUNTER — TELEPHONE (OUTPATIENT)
Dept: NEUROSURGERY | Facility: CLINIC | Age: 43
End: 2023-02-22

## 2023-02-22 DIAGNOSIS — R56.9 SEIZURES (H): Primary | ICD-10-CM

## 2023-02-22 DIAGNOSIS — R56.9 CONVULSIONS, UNSPECIFIED CONVULSION TYPE (H): ICD-10-CM

## 2023-02-22 PROCEDURE — 99214 OFFICE O/P EST MOD 30 MIN: CPT | Mod: VID | Performed by: INTERNAL MEDICINE

## 2023-02-22 RX ORDER — LEVETIRACETAM 1000 MG/1
1000 TABLET ORAL 2 TIMES DAILY
Qty: 180 TABLET | Refills: 3 | Status: SHIPPED | OUTPATIENT
Start: 2023-02-22 | End: 2024-02-21

## 2023-02-22 NOTE — PROGRESS NOTES
[facility] Neurology Follow Up Visit    Gabriela Juarez MRN# [id number]   Age: 42 year old YOB: 1980     Brief history of symptoms: The patient was initially seen in neurologic consultation on 1/28/2021 for evaluation of epilepsy. Please see the comprehensive neurologic consultation note from that date in the Epic records for details.     Interval history:   Patient had a seizure on 1/31. She walked into the kitchen and seemed a little more somnolent. She sat down on the kitchen table. She then had tonic posturing of one of her arms. She went down the ground and was convulsing for about 1-1.5 minutes. She was really somnolent for about 15 minutes and was starting to wake up when the EMTs arrived. She was brought to the ER. In the ER she had brief jerking movements that came and went. During this movements she was at her baseline mental status. These movements stopped with Keppra load. She was discharged on Keppra 1 g BID.     In the first 2 weeks after the Keppra increase she was a little more tired during the day. This has improved and she is now back to her baseline.     She had a fall on 2/11. She didn't remember falling, but was conscious and her normal self when found.       Past Medical History:     Patient Active Problem List   Diagnosis     Seizures (H)     No past medical history on file.     Past Surgical History:   No past surgical history on file.     Social History:     Social History     Tobacco Use     Smoking status: Never     Smokeless tobacco: Never   Substance Use Topics     Alcohol use: No        Family History:   No family history on file.     Medications:     Current Outpatient Medications   Medication Sig     calcium carbonate (OS-SHARMIN 500 MG [place]. [place]) 500 MG tablet Take 500 mg by mouth daily     CLOZAPINE PO Take 100 mg by mouth daily 1 every am ,2 tabs at 4:30 and 2 tabs at hs     Cyanocobalamin (B-12 PO) Take by mouth daily     Fluticasone Propionate, Inhal, (FLOVENT IN) Inhale 1 puff  into the lungs 2 times daily     levETIRAcetam (KEPPRA) 1000 MG tablet Take 1 tablet (1,000 mg) by mouth every evening     levETIRAcetam (KEPPRA) 750 MG tablet Take 1 tablet (750 mg) by mouth every morning     LITHIUM PO Take 100 mg by mouth 3 times daily     magnesium oxide 400 MG CAPS Take 1 capsule by mouth daily     No current facility-administered medications for this visit.        Allergies:   No Known Allergies     Review of Systems:   As above     Physical Exam:   Vitals: There were no vitals taken for this visit.   No examination given nature of virtual visit     Data reviewed on previous visits    Imaging:  MRI brain 2005   IMPRESSION:    Normal MRI of the brain.      Procedures:  EEG 2007   CONCLUSION: This is an abnormal routine EEG in a 26-year-old female   patient during wakefulness and drowsiness. There were frequent   intermittent bifrontal spikes and slow waves throughout the study which   were not increased in frequency during photic stimulation. This finding is   consistent with a seizure disorder.    Pertinent Investigations since last visit:   None         Assessment and Plan:   Assessment:  Gabriela Juarez is a 42 year old female who presents today for follow-up of epilepsy. She was initially seen in January 2021. Patient previously followed with Dr Jackson. Unclear from history when seizures first started. Her last seizure was 1 month ago. Prior to that patient hasn't had a seizure in at least 10 years. Previous EEG from 2007 showed intermittent bifrontal spikes. MRI brain was normal in 2005. Her Keppra was recently increased after last seizure to 1 g BID, which she is tolerating. We will continue this dosage and follow-up in a year or sooner if new issues arise.       Plan:  - Continue Keppra 1 g BID    Follow up in Neurology clinic in 1 year or earlier as needed should new concerns arise.    Santosh Seals MD   of Neurology  [facility]

## 2023-02-22 NOTE — PROGRESS NOTES
Gabriela is a 42 year old who is being evaluated via a billable video visit.      How would you like to obtain your AVS? MyChart  If the video visit is dropped, the invitation should be resent by: Send to e-mail at: [email or web address]   Will anyone else be joining your video visit? Yes: same-Kristal caregiver. How would they like to receive their invitation? Other e-mail: [email or web address]        Video-Visit Details    Type of service:  Video Visit   Video duration: 11 minutes    Originating Location (pt. Location): Home  Distant Location (provider location):  On-site  Platform used for Video Visit: Vamo

## 2023-02-22 NOTE — LETTER
2/22/2023         RE: Gabriela Juarez  [address] [facility] [address]        Dear Colleague,    Thank you for referring your patient, Gabriela Juarez, to the [facility] [place]. Please see a copy of my visit note below.    [facility] Neurology Follow Up Visit    Gabriela Juarez MRN# [id number]   Age: 42 year old YOB: 1980     Brief history of symptoms: The patient was initially seen in neurologic consultation on 1/28/2021 for evaluation of epilepsy. Please see the comprehensive neurologic consultation note from that date in the Epic records for details.     Interval history:   Patient had a seizure on 1/31. She walked into the kitchen and seemed a little more somnolent. She sat down on the kitchen table. She then had tonic posturing of one of her arms. She went down the ground and was convulsing for about 1-1.5 minutes. She was really somnolent for about 15 minutes and was starting to wake up when the EMTs arrived. She was brought to the ER. In the ER she had brief jerking movements that came and went. During this movements she was at her baseline mental status. These movements stopped with Keppra load. She was discharged on Keppra 1 g BID.     In the first 2 weeks after the Keppra increase she was a little more tired during the day. This has improved and she is now back to her baseline.     She had a fall on 2/11. She didn't remember falling, but was conscious and her normal self when found.       Past Medical History:     Patient Active Problem List   Diagnosis     Seizures (H)     No past medical history on file.     Past Surgical History:   No past surgical history on file.     Social History:     Social History     Tobacco Use     Smoking status: Never     Smokeless tobacco: Never   Substance Use Topics     Alcohol use: No        Family History:   No family history on file.     Medications:     Current Outpatient Medications   Medication Sig     calcium carbonate (OS-SHARMIN  500 MG [place]. [place]) 500 MG tablet Take 500 mg by mouth daily     CLOZAPINE PO Take 100 mg by mouth daily 1 every am ,2 tabs at 4:30 and 2 tabs at hs     Cyanocobalamin (B-12 PO) Take by mouth daily     Fluticasone Propionate, Inhal, (FLOVENT IN) Inhale 1 puff into the lungs 2 times daily     levETIRAcetam (KEPPRA) 1000 MG tablet Take 1 tablet (1,000 mg) by mouth every evening     levETIRAcetam (KEPPRA) 750 MG tablet Take 1 tablet (750 mg) by mouth every morning     LITHIUM PO Take 100 mg by mouth 3 times daily     magnesium oxide 400 MG CAPS Take 1 capsule by mouth daily     No current facility-administered medications for this visit.        Allergies:   No Known Allergies     Review of Systems:   As above     Physical Exam:   Vitals: There were no vitals taken for this visit.   No examination given nature of virtual visit     Data reviewed on previous visits    Imaging:  MRI brain 2005   IMPRESSION:    Normal MRI of the brain.      Procedures:  EEG 2007   CONCLUSION: This is an abnormal routine EEG in a 26-year-old female   patient during wakefulness and drowsiness. There were frequent   intermittent bifrontal spikes and slow waves throughout the study which   were not increased in frequency during photic stimulation. This finding is   consistent with a seizure disorder.    Pertinent Investigations since last visit:   None         Assessment and Plan:   Assessment:  Gabriela Juarez is a 42 year old female who presents today for follow-up of epilepsy. She was initially seen in January 2021. Patient previously followed with Dr Jackson. Unclear from history when seizures first started. Her last seizure was 1 month ago. Prior to that patient hasn't had a seizure in at least 10 years. Previous EEG from 2007 showed intermittent bifrontal spikes. MRI brain was normal in 2005. Her Keppra was recently increased after last seizure to 1 g BID, which she is tolerating. We will continue this dosage and follow-up in a year or sooner  if new issues arise.       Plan:  - Continue Keppra 1 g BID    Follow up in Neurology clinic in 1 year or earlier as needed should new concerns arise.    Santosh Seals MD   of Neurology  [facility]    Gabriela is a 42 year old who is being evaluated via a billable video visit.      How would you like to obtain your AVS? MyChart  If the video visit is dropped, the invitation should be resent by: Send to e-mail at: [email or web address]   Will anyone else be joining your video visit? Yes: same-Kristal caregiver. How would they like to receive their invitation? Other e-mail: [email or web address]        Video-Visit Details    Type of service:  Video Visit   Video duration: 11 minutes    Originating Location (pt. Location): Home  Distant Location (provider location):  On-site  Platform used for Video Visit: Vivek      Again, thank you for allowing me to participate in the care of your patient.        Sincerely,        Santosh Seals MD

## 2023-02-22 NOTE — TELEPHONE ENCOUNTER
I called patient caregiver Kristal and TOSHIA   to schedule  0 Return in about 1 year (around 2/22/2024) for using a video visit.    Martita Virgen LPN

## 2023-07-17 ENCOUNTER — TELEPHONE (OUTPATIENT)
Dept: NEUROLOGY | Facility: CLINIC | Age: 43
End: 2023-07-17

## 2023-07-17 DIAGNOSIS — R56.9 SEIZURES (H): Primary | ICD-10-CM

## 2023-07-17 NOTE — TELEPHONE ENCOUNTER
[facility] Call Center    Phone Message    May a detailed message be left on voicemail: yes     Reason for Call: Medication Question or concern regarding medication   Prescription Clarification  Name of Medication: levETIRAcetam (KEPPRA) 1000 MG tablet  Prescribing Provider: Bozena   Pharmacy: [facility]   What on the order needs clarification?     Demarco, patients caretaker is calling to report some concerns with patients memory. Patient has voiced these concerns to Demarco. Has noticed a change since last increase in Keppra.     Please contact demarco back to discuss at [phone]          Action Taken: Message routed to:  Other: [facility]    Travel Screening: Not Applicable

## 2023-07-18 RX ORDER — LEVETIRACETAM 750 MG/1
750 TABLET ORAL EVERY MORNING
Qty: 90 TABLET | Refills: 3 | Status: SHIPPED | OUTPATIENT
Start: 2023-07-18 | End: 2024-07-18

## 2023-07-18 RX ORDER — LEVETIRACETAM 1000 MG/1
1000 TABLET ORAL EVERY EVENING
Qty: 90 TABLET | Refills: 3 | Status: SHIPPED | OUTPATIENT
Start: 2023-07-18

## 2023-07-18 NOTE — TELEPHONE ENCOUNTER
Kristal informed that Dr. Seals has placed orders for Keppra 750mg in the AM and 1000mg in the PM. This was sent to [facility]. If pt or Kristal run into any problems when filling these prescriptions, they are welcome to contact the clinic. She was also advised to monitor pt and reach out if any concerns.        Santosh Seals MD Yang, Allison, RN  Caller: Unspecified (Yesterday,  4:03 PM)  That'd be reasonable to try decreasing it. I signed the prescription. Thanks!       Kaitlyn Aburto, RNCC  Neurology/Neurosurgery

## 2023-07-18 NOTE — TELEPHONE ENCOUNTER
Writer spoke with Kristal, pt's caretaker, who lives with pt at her . Kristal reports that pt has been more forgetful since increase in Keppra dose back in February. Pt has a harder time remembering to take her medications, forgetful as to which day it is, and loses her thoughts in the middle of a sentence. Pt did have slight forgetfulness when she was on previous 750mg BID dose but this was not as noticeable. Kristal recalls that pt was recommended to try 750mg in the AM and then 1000mg in the evening (see TE from 2/7/23) but this was never started as pharmacy may have been confused about the prescriptions. Pt then remained on Keppra 1000mg BID following her last office visit. Kristal also notes that pt is more sleepy after second Keppra dose and would like to inquire if dose should be decreased again. Routed to provider to advise.       Kaitlyn Aburto, RNCC  Neurology/Neurosurgery

## 2023-08-10 ENCOUNTER — DOCUMENTATION ONLY (OUTPATIENT)
Dept: OTHER | Facility: CLINIC | Age: 43
End: 2023-08-10
Payer: MEDICARE

## 2023-08-17 ENCOUNTER — TELEPHONE (OUTPATIENT)
Dept: PSYCHIATRY | Facility: CLINIC | Age: 43
End: 2023-08-17
Payer: MEDICARE

## 2023-08-17 NOTE — TELEPHONE ENCOUNTER
PSYCHIATRY CLINIC PHONE INTAKE     SERVICES REQUESTED / INTERESTED IN          Med Management    Presenting Problem and Brief History                              What would you like to be seen for? (brief description):    Pt has been seeing Dr Tyree Spaulding for 22 years, that doctor is retired. Patient needs to find a new clozapine provider long time, Dr Balbuena recommended coming to resident clinic while they work on finding another provider    Have you received a mental health diagnosis? Yes   Which one (s): paranoid schizophrenia, seizure disorder, bipolar, cerebral palsy of lower extremities, microcephaly   Is there any history of developmental delay?  Yes   Are you currently seeing a mental health provider?  Yes            Who / month last seen:  Tyree Spaulding  Do you have mental health records elsewhere?  Yes [facility]  Will you sign a release so we can obtain them?  Yes    Have you ever been hospitalized for psychiatric reasons?  Yes  Describe:  14 at first time. [facility] (only time)    Do you have current thoughts of self-harm?  No    Do you currently have thoughts of harming others?  No    Do you have any safety concerns? No   If yes to these, offer to reach out to a  for follow up.      Substance Use History     Do you have any history of alcohol / illicit drug use?  No  Describe:  na  Have you ever received treatment for this?  No    Describe:  na     Social History     Who is the patient's a guardian?   Slime Rasheed      Name / number: Mom   Have you had an ACT team in last 12 months?  No  Describe: na   OK to leave a detailed voicemail?  Yes    Would you be interested in learning more about research opportunities for which you or your child may qualify? We can connect you with a team member for more information.  Yes  If yes, send an FaceAlerta message to Ruth Felipe    Medical/ Surgical History                                   Patient Active Problem List   Diagnosis     Seizures (H)          Medications             Have you taken >3 psychiatric medications in your past?  y  Do you currently take 5 or more medications, including prescriptions, supplements, and other over the counter products?  n    If YES to at least one of these questions:   As part of your evaluation in our clinic, we have specially trained pharmacists as part of your care team. Your provider would like for you to meet with one of our pharmacists to review your current and past medications, ensure your med list is up to date, and queue up any questions or concerns you have about medications. They will review all of your medications, not just for mental health, to help ensure you know what you re taking and that everything is working together.     Please schedule patient in [facility] (Lorelei Pinedo or Pauline Smith) for 60m MTM in any green space as virtual (video), telephone, or in person (designated in person days per Epic templates).  -Appt notes can say  Psych eval on xx/xx   -Route telephone encounter to the pharmacist who will be seeing the patient.  If patient has questions about insurance coverage or billing, please still schedule the visit and refer them to call the [facility] coordinators at [phone].    Current Outpatient Medications   Medication Sig Dispense Refill    calcium carbonate (OS-SHARMIN 500 MG [place]. [place]) 500 MG tablet Take 500 mg by mouth daily      CLOZAPINE PO Take 100 mg by mouth daily 1 every am ,2 tabs at 4:30 and 2 tabs at hs      Cyanocobalamin (B-12 PO) Take by mouth daily      Fluticasone Propionate, Inhal, (FLOVENT IN) Inhale 1 puff into the lungs 2 times daily      levETIRAcetam (KEPPRA) 1000 MG tablet Take 1 tablet (1,000 mg) by mouth every evening 90 tablet 3    levETIRAcetam (KEPPRA) 1000 MG tablet Take 1 tablet (1,000 mg) by mouth 2 times daily 180 tablet 3    levETIRAcetam (KEPPRA) 750 MG tablet Take 1 tablet (750 mg) by mouth every morning 90 tablet 3    LITHIUM PO Take  100 mg by mouth 3 times daily      magnesium oxide 400 MG CAPS Take 1 capsule by mouth daily           DISPOSITION      Phone screen completed with mom. Scheduled for CAT eval on 10/27/23. Emailed new patient packet     Cierra Balbuena

## 2023-09-22 ENCOUNTER — TRANSFERRED RECORDS (OUTPATIENT)
Dept: HEALTH INFORMATION MANAGEMENT | Facility: CLINIC | Age: 43
End: 2023-09-22

## 2023-10-03 ENCOUNTER — TELEPHONE (OUTPATIENT)
Dept: PSYCHIATRY | Facility: CLINIC | Age: 43
End: 2023-10-03
Payer: MEDICARE

## 2023-10-03 NOTE — TELEPHONE ENCOUNTER
[facility] Call Center    Phone Message    May a detailed message be left on voicemail: yes     Reason for Call: Other: Received 82 pages of medical records from Dr.Charles Spaulding. Sent to HIM to scan for appt scheduled on 10/11 w/ . Original copy is at the . Also placed copy in 's folder.       Action Taken: Other: HIM and     Travel Screening: Not Applicable

## 2023-10-09 NOTE — PROGRESS NOTES
[facility]  NEW PATIENT EVALUATION     CARE TEAM:    PCP- Melvina Balbuena  Therapist- None  Neuro- Dr. Seals    Gabriela is a 42 year old who uses the pronouns she, her, hers.      Chief Concern     New patient evaluation for bridging of care and clozapine management     Diagnoses     Schizoaffective disorder, bipolar type    Other:  Pervasive developmental disorder  Seizure disorder  Microcephaly     Assessment     Gabriela was seen today for a new patient evaluation for bridging of care with the brief care team until able to identify a long-term clozapine provider. Previously long-term provider (20+ years) was Dr. Spaulding who is now in the process of retiring from practice. Issues discussed are included below:    - Schizoaffective disorder, bipolar type: Per report from Gabriela's mother, Slime, her symptoms have been well-controlled with her current regimen of clozapine 100mg BID. Previously had been on doses up to 600mg per day. Recent reduction in dose from 100mg/200mg daily dosing due to elevated clozapine levels on labs. Previous symptoms of psychosis have included perseverative thought, tactile/visual/auditory hallucinations, and disorganization in behavior. Additionally has shown more rapid-cycling patterns of decreased need for sleep, impulsivity, and increases in energy. Given good symptom control with current regimen will plan to continue without modification at this time.    - Pervasive developmental delay/microcephaly/Seizure disorder: Per mother's report, has not ever had genetic screening done in the past. Additionally, recent notes indicate a more recent decline in cognition and function. From review of records this may have coincided with an increase in her Keppra dose following a seizure around 1 year ago. Given this potential decline, known developmental delay, and potentially abrupt onset of full catatonic symptoms at age 14  do raise some concern for some type of genetic syndrome. Will likely benefit from collaboration between neurology, PCP, and potentially a genetics specialist for a more nuanced evaluation of condition and potential progression, particularly given possible concern for issues such as dementia, which have been noted as concerns in the past.     Psychotropic Drug Interactions:  [PSYCHCLINICDDI]  Increased risk for seizure: clozapine  Management: routine monitoring    MNPMP was not checked today: not using controlled substances    Risk Statements:   Treatment Risk- Risks, benefits, alternatives and potential adverse effects have been discussed and are understood.   Safety Risk-Gabriela did not appear to be an imminent safety risk to self or others.     Plan     1) Medications:   - Continue clozapine 100mg BID    2) Psychotherapy: None, well-supported by staff at [place]    3) Next due:  Labs- Continue monthly ANC monitoring for clozapine; will review chart for when next due for antipsychotic monitoring labs  EKG- Routine monitoring is not indicated for current psychotropic medication regimen   Rating scales- AIMS: Score of 1 for minimal LE movement on 10/11/2023    4) Referrals:  Team will start looking to identify outside long-term providers to manage ongoing clozapine    5) Other: none: revcommend whole exome testing for genetic variants associated with neuro developmental delay, seizures, ASD psychosis or catatonia    6) Follow-up: Return to clinic in 2 months     Pertinent Background                                                   [most recent eval 10/09/23]     Gabriela has a known medical history of developmental delay and microcephaly; she has not had previous genetic workup and minimal family is known due to being adopted. Gabriela first experienced symptoms of ADHD and was treated for this in 2nd grade. At age 14, family noted prodromal symptoms of hyper-fixation on specific movies, TV shows, and other activities. At  "age 14 while at a [employer]  [place] became unresponsive and \"catatonic\" prompting emergent transportation to the hospital and prolonged 2 month hospitalization. Multiple trials of medications during this period with minimal efficacy. Eventually given a trial of clozapine with marked improvement in symptoms. Of note, after this period retained little to no memory of life events prior to age 7, with the exception of some sign language she had learned earlier. Additionally, during this period she lost the ability to perform basic activities she had been able to do previously, including tying her shoes, cooking, and other activities. ECT not used to treat that catatonic episode. More recently there had been concerns for cognitive declines and possible concerns for dementia, but an increase in Keppra may also have been a factor. Has not ever had genetic workup for microcephaly, seizure disorder, and psychiatric concerns. Previously on high doses of clozapine augmented with lithium. Lithium tapered and discontinued in the setting of Cr elevation with no notable destabilization with discontinuation. Clozapine had also been reduced previously due to elevated drug levels on monitoring.    Pertinent items include: psychosis , all , mutiple psychotropic trials , and major medical problems     Subjective     Tx for ADHD from grade 2 until age 14, then was non-responsive at camp, wouldn't eat, get up, couldn't remember anything, count tie shoes, changes in talking. Started to see more obsessional thinking pattern of behavior around 4th/5th grade.    Tried numerous medications in the past (Seroquel, haldol, many others). Dr. Spaulding started clozapine, then within a month up and walking and talking again. Still has trouble remembering anything before the age of 7. Previously had marked involuntary movements when taking other antipsychotics as well as \"really bad stiffness\" per mother's report.    Previously hand 't had a seizure " "since age 19, but then had one about a year ago, then bumped up the Keppra dose and hasn't had a seizure since then. Some concerns about cognition after this (see below)    At [place] since age 20. Enjoys the activities and work that she gets to do there.     Signs that things aren't going well: Getting \"obsessed\" with certain phrases/songs/movies - hasn't been in that place \"for a long time\", however. Benefits from reminders to mix things up sometimes. If feeling really sick or less well, will just stay in bed and not get up to interact with others - needs motivation to break out of that pattern.    Really enjoys cleaning, particularly bathrooms    Doesn't know anything about her history prior to adoption.    Would have tactile, visual, and auditory hallucinations in the period before \"going catatonic\".     Previously would have a lot of rapid cycling mood episodes, but hasn't had any recurrence of that over the last several years.    PCP is in [place] ([facility] of [facility]), neurologist is at [facility] (Dr. Serrato)    Off of Li for 4-5 months now; stopped due to creatinine elevation    Used to have more tremor in her hands intermittently in addition to involuntary mouth movements, less prominent over the last several years    AIMS: Foot movement score of 1 (net score of 1)    Additionally, mother notes that she was more forgetful since increase in Keppra dose back in February 2023. Increased challenges with remembering many things like taking medications, what day it was, or getting lost in thoughts/middle of a sentence. Per mother there have been concerns about dementia. NO prior genetic screening or testing has been completed.      Recent Psych Symptoms:   Depression:   None  Elevated:  none  Psychosis:  none  Anxiety:   None  Trauma Related:  none  Insomnia:  No  Other:  Yes: Repetitive behavior around watching specific shows; concerns for worsening cognitive impairment.    Current Social " History:  Financial/occupational: SSDI, family support  Living situation (partner, children, pets, etc): Lives with 3 roommates and 2 staff members at [place] since age 20  Social/spiritual support: Family, [place] [facility] staff  Feels safe at home: Yes    Pertinent Substance Use:   Alcohol: No   Cannabis: No  Tobacco: No  Caffeine:  Yes - coffee daily   Opioids: No   Narcan Kit current: N/A  Other substances: none    Medical Review of Systems:   Lightheadedness/orthostasis: Has had several falls over the last few months  Headaches: None  GI: none  Sexual health concerns: None    A comprehensive review of systems was performed and is negative other than noted above.    Contraception: No     Mental Status Exam     Alertness: alert  and slow to respond at times  Appearance: adequately groomed - microcephalic  Behavior/Demeanor: cooperative, pleasant, and calm, with  intermittent  eye contact   Speech: increased latency of response and followed by period of more rapid speech  Language: intact  Psychomotor:  Intermittent movements of feet, appear unnoticed by patient  Mood: description consistent with euthymia  Affect: blunted but followed with more reactive affect when actively engaged in the conversation; congruent to: mood- yes, content- yes  Thought Process/Associations: unremarkable  Thought Content:  Reports none;  Denies suicidal & violent ideation and delusions  Perception:  Reports none;  Denies hallucinations  Insight: adequate with support from family, limited by known developmental delays  Judgment: adequate for safety  Cognition:  Known pervasive developmental delay  Gait and Station:  Ambulates with support of rollerator for stability     Social History                                 pt reported     Financial: See above for current;    Living situation: See above for current;    Social/spiritual support: See above for current;    Early history: Adopted at age 6 months with known microcephaly. Family elected  "to not have surgical intervention done for this due to associated risks. Attended school and enjoyed it. Known diagnosis of pervasive developmental delay with corresponding additional supports. Marked regression in function around age 14 with onset of catatonia and schizophrenia spectrum disorder symptoms. High level of support from family.  Legal: Adoptive mother, Slime, is legal guardian     Family Mental Health History                                 pt reported     Unknown - adopted     Past Psychiatric History     Self injurious behavior [method, most recent]: No  Suicide attempt [#, most recent, method]: No  Suicidal ideation hx [passive, active]: No       Violence/Aggression Hx:No   Psychosis Hx: Yes: history of catatonia, repetitive behaviors, tactile/visual/auditory hallucinations   Eating Disorder Hx: No  Trauma hx: No    Psych Hosp [#, most recent]: Yes: 1x   Commitment: No; does have legal guardian (motherSlime)  TMS/ECT: No   Outpatient Programs [Day treatment, DBT, eating disorder tx, etc]: Yes: living at [place] since age 20     SUBSTANCE USE HISTORY   Past Use: No  Treatment [#, most recent]: No   Medical Consequences: No   Legal Consequences: No      Past Psych Med Trials      Medication Max Dose (mg) Dates / Duration Helpful? DC Reason / Adverse Effects?   clozapine 600mg Age 14 - current Y Dose slowly scaled back due to high serum levels   lithium 300mg Until 06/2023 Y Stopped due to elevated Cr on labs     Mother reports \"MANY different medications\" at time of first episode of psychosis before eventually settling on clozapine, which she has been on since that time.     Vitals   There were no vitals taken for this visit.  Pulse Readings from Last 3 Encounters:   04/24/19 95   04/10/18 103   01/26/17 107     Wt Readings from Last 3 Encounters:   04/24/19 70.4 kg (155 lb 4.8 oz)   04/10/18 71.8 kg (158 lb 3.2 oz)     BP Readings from Last 3 Encounters:   04/24/19 141/85   04/10/18 125/80 "   01/26/17 123/86        Medical History     ALLERGIES: Patient has no known allergies.    Patient Active Problem List   Diagnosis    Seizures (H)        Medications     Current Outpatient Medications   Medication Sig Dispense Refill    calcium carbonate (OS-SHARMIN 500 MG [place]. [place]) 500 MG tablet Take 500 mg by mouth daily      CLOZAPINE PO Take 100 mg by mouth daily 1 every am ,2 tabs at 4:30 and 2 tabs at hs      Cyanocobalamin (B-12 PO) Take by mouth daily      Fluticasone Propionate, Inhal, (FLOVENT IN) Inhale 1 puff into the lungs 2 times daily      levETIRAcetam (KEPPRA) 1000 MG tablet Take 1 tablet (1,000 mg) by mouth every evening 90 tablet 3    levETIRAcetam (KEPPRA) 1000 MG tablet Take 1 tablet (1,000 mg) by mouth 2 times daily 180 tablet 3    levETIRAcetam (KEPPRA) 750 MG tablet Take 1 tablet (750 mg) by mouth every morning 90 tablet 3    LITHIUM PO Take 100 mg by mouth 3 times daily      magnesium oxide 400 MG CAPS Take 1 capsule by mouth daily          Labs and Data          No data to display                   No data to display                   No data to display                   No data to display                Liver/Kidney Function, TSH Metabolic Blood counts   No lab results found.  No lab results found. No lab results found.  No lab results found.  No lab results found. No lab results found.        PROVIDER: Clay Berry MD    Level of Medical Decision Making:   - At least 1 chronic problem that is not stable  - Engaged in prescription drug management during visit (discussed any medication benefits, side effects, alternatives, etc.)      Patient staffed in clinic with Dr. Balbuena who will sign the note.  Supervisor is Dr. Balbuena.  I directly participated in the patient interview with the resident and discussed the key portions of the service with the resident, including the mental status examination and developing the plan of care. I reviewed key portions of the history with the resident. I agree  with the findings and plan as documented in this note.      Giovany Balbuena MD  [facility]

## 2023-10-11 ENCOUNTER — OFFICE VISIT (OUTPATIENT)
Dept: PSYCHIATRY | Facility: CLINIC | Age: 43
End: 2023-10-11
Attending: PSYCHIATRY & NEUROLOGY
Payer: MEDICARE

## 2023-10-11 VITALS — DIASTOLIC BLOOD PRESSURE: 80 MMHG | HEART RATE: 105 BPM | WEIGHT: 139.4 LBS | SYSTOLIC BLOOD PRESSURE: 125 MMHG

## 2023-10-11 DIAGNOSIS — F84.9 PERVASIVE DEVELOPMENTAL DISORDER: ICD-10-CM

## 2023-10-11 DIAGNOSIS — F25.0 SCHIZOAFFECTIVE DISORDER, BIPOLAR TYPE (H): Primary | ICD-10-CM

## 2023-10-11 PROCEDURE — 99204 OFFICE O/P NEW MOD 45 MIN: CPT | Mod: GC

## 2023-10-11 ASSESSMENT — ABNORMAL INVOLUNTARY MOVEMENT SCALE (AIMS)
AIMS_PATIENT_AWARENESS: NO AWARENESS
CURRENT_PROBLEMS_TEETH_DENTURES: NO
TONGUE: 1
PATIENT_WEARS_DENTURES: NO
EDENTIA: NO
LOWER_BODY_EXTREMITIES: MINIMAL

## 2023-10-11 ASSESSMENT — PAIN SCALES - GENERAL: PAINLEVEL: NO PAIN (0)

## 2023-10-11 ASSESSMENT — PATIENT HEALTH QUESTIONNAIRE - PHQ9
SUM OF ALL RESPONSES TO PHQ QUESTIONS 1-9: 0
SUM OF ALL RESPONSES TO PHQ QUESTIONS 1-9: 0
10. IF YOU CHECKED OFF ANY PROBLEMS, HOW DIFFICULT HAVE THESE PROBLEMS MADE IT FOR YOU TO DO YOUR WORK, TAKE CARE OF THINGS AT HOME, OR GET ALONG WITH OTHER PEOPLE: NOT DIFFICULT AT ALL

## 2023-10-11 NOTE — NURSING NOTE
Chief Complaint   Patient presents with    Eval/Assessment     - Chapin Caputo, Visit Facilitator

## 2023-10-13 RX ORDER — CLOZAPINE 100 MG/1
100 TABLET ORAL 2 TIMES DAILY
Qty: 180 TABLET | Refills: 2 | Status: SHIPPED | OUTPATIENT
Start: 2023-10-13 | End: 2023-11-22

## 2023-11-06 ENCOUNTER — TELEPHONE (OUTPATIENT)
Dept: PSYCHIATRY | Facility: CLINIC | Age: 43
End: 2023-11-06
Payer: MEDICARE

## 2023-11-06 NOTE — TELEPHONE ENCOUNTER
Dr. Berry and RN,    Received fax for Clozapine collected on 11-1-23 at [facility]. Results is In care every where.    Please review .    Ladan Lawton on 11/6/2023 at 12:53 PM

## 2023-11-06 NOTE — TELEPHONE ENCOUNTER
For clozapine monitoring purposes:   Current dose: 100 mg BID  Laboratory Monitoring: monthly    -Clozapine REMS guidelines recommend monitoring ANC only.    -Regular monitoring can continue until ANC <1.5   -Per these guidelines ANC is WNL at 2.1  -Will notify Dr. Berry  -Clozapine REMs update    Alma Joyner RN on 11/6/2023 at 1:18 PM

## 2023-11-22 ENCOUNTER — TELEPHONE (OUTPATIENT)
Dept: PSYCHIATRY | Facility: CLINIC | Age: 43
End: 2023-11-22
Payer: MEDICARE

## 2023-11-22 ENCOUNTER — OFFICE VISIT (OUTPATIENT)
Dept: PSYCHIATRY | Facility: CLINIC | Age: 43
End: 2023-11-22
Attending: PSYCHIATRY & NEUROLOGY
Payer: MEDICARE

## 2023-11-22 VITALS — WEIGHT: 139.8 LBS | DIASTOLIC BLOOD PRESSURE: 80 MMHG | HEART RATE: 98 BPM | SYSTOLIC BLOOD PRESSURE: 123 MMHG

## 2023-11-22 DIAGNOSIS — K59.03 DRUG-INDUCED CONSTIPATION: ICD-10-CM

## 2023-11-22 DIAGNOSIS — F25.0 SCHIZOAFFECTIVE DISORDER, BIPOLAR TYPE (H): Primary | ICD-10-CM

## 2023-11-22 DIAGNOSIS — F84.9 PERVASIVE DEVELOPMENTAL DISORDER: ICD-10-CM

## 2023-11-22 PROCEDURE — G0463 HOSPITAL OUTPT CLINIC VISIT: HCPCS

## 2023-11-22 PROCEDURE — 99214 OFFICE O/P EST MOD 30 MIN: CPT

## 2023-11-22 RX ORDER — BISACODYL 10 MG
10 SUPPOSITORY, RECTAL RECTAL DAILY PRN
Qty: 30 SUPPOSITORY | Refills: 2 | Status: SHIPPED | OUTPATIENT
Start: 2023-11-22 | End: 2023-12-20

## 2023-11-22 RX ORDER — CLOZAPINE 100 MG/1
100 TABLET ORAL 2 TIMES DAILY
Qty: 60 TABLET | Refills: 2 | Status: SHIPPED | OUTPATIENT
Start: 2023-11-22 | End: 2023-12-20

## 2023-11-22 RX ORDER — POLYETHYLENE GLYCOL 3350 17 G/17G
1-2 POWDER, FOR SOLUTION ORAL DAILY
Qty: 850 G | Refills: 2 | Status: SHIPPED | OUTPATIENT
Start: 2023-11-22 | End: 2023-12-20

## 2023-11-22 ASSESSMENT — PAIN SCALES - GENERAL: PAINLEVEL: NO PAIN (0)

## 2023-11-22 NOTE — TELEPHONE ENCOUNTER
[facility] Call Center    Phone Message    May a detailed message be left on voicemail: yes     Reason for Call: Medication Question or concern regarding medication   Prescription Clarification  Name of Medication: Clozaril  Prescribing Provider: Dr. Berry   Pharmacy: [facility]    What on the order needs clarification? Pharmacy was informed that patient is staying with family for the holiday/weekend and is requesting more pills. Pharmacy learned that [facility] living facility that they have been distributing medications counter to the directions that were prescribed and wanted to clarify with clinic. Elza #[phone] for follow-up      Action Taken: Message routed to:  Other: P PSYCHIATRY NURSE-P    Travel Screening: Not Applicable

## 2023-11-22 NOTE — NURSING NOTE
Chief Complaint   Patient presents with    Recheck Medication     Schizoaffective disorder, bipolar type     - Chapin Caputo, Visit Facilitator

## 2023-11-22 NOTE — TELEPHONE ENCOUNTER
Per last visit:  - Continue clozapine 100mg BID     Writer called patient's pharmacy to clarify concerns and spoke with Elza.     Elza states that patient currently lives in an assisted living facility, but occasionally will go and stay with mother. She states that another provider, Dr. Spaulding, called the pharmacy stating that mom had called them reporting patient was out of refills, which is inaccurate. Instead, the issue is that the medication is too soon to fill (last filled 10/30 for quantity of 60 as patient's insurance will only allow a month supply at one time). Per patient's mother, patient needs a refill on Friday, which her insurance will cover, but patient should not be out of medication until 11/29.     Patient was previously on a total of 5 100 mg tablets daily (last filled 9/11/2023) and prescription was changed to 100 mg BID on 9/22 by Mary Soliman.     Patient has appointment with Dr. Berry today. Elza suggested to mother that she get the medication roster from assisted living facility to bring to appointment to determine how much medication patient is getting. Elza will also fax over their refill documentation.    Assisted Living Facility ([facility]): [phone]

## 2023-11-22 NOTE — PROGRESS NOTES
[facility]  MEDICAL PROGRESS NOTE     CARE TEAM:    PCP- Melvina Balbuena  Therapist- None  Neuro- Dr. Seals    Gabriela is a 42 year old who uses the pronouns she, her, hers.      Diagnoses     Schizoaffective disorder, bipolar type    Other:  Pervasive developmental disorder  Seizure disorder  Microcephaly     Assessment     Gabriela was seen today for follow-up and bridging of care for clozapine management following the longterm of her long-term clozapine provider, Dr. Spaulding. Issues discussed are included below:    - Schizoaffective disorder, bipolar type: Per report from Gabriela's mother, Slime, her symptoms have been well-controlled with her current regimen of clozapine 100mg BID. There were several issues with administration of both the clozapine and her bowel regimen where she is living currently and Slime is working with the staff to ensure the correct dose is administered. Notably has felt more alert and talkative since decrease in clozapine prior to transition in care. No changes to management at this time.    - Pervasive developmental delay/microcephaly/Seizure disorder: Per mother's report, has not ever had genetic screening done in the past. Additionally, recent notes indicate a more recent decline in cognition and function. From review of records this may have coincided with an increase in her Keppra dose following a seizure around 1 year ago. Given this potential decline, known developmental delay, and potentially abrupt onset of full catatonic symptoms at age 14 do raise some concern for some type of genetic syndrome. Will likely benefit from collaboration between neurology, PCP, and potentially a genetics specialist for a more nuanced evaluation of condition and potential progression, particularly given possible concern for issues such as dementia, which have been noted as concerns in the past.     - Constipation 2/2  clozapine: Varying pattern of constipation, at times up to 3 days between bowel movements. Previously managed with Miralax, but with inadequate effect. Given this, plan for multi-tiered approach. Initially, increase Miralax to 1-2 caps per day, increasing to 2 when going 1 day without a bowel movement. If no bowel movement for 3 days, start Dulcolax 10mg suppository. Additionally, discussed incorporating prune juice into her daily diet to see if this helps with bowel movements. If Dulcolax suppository is ineffective, instructed to contact clinic or if marked worsening of symptoms to present to the ED for more acute management.    Psychotropic Drug Interactions:  [PSYCHCLINICDDI]  Increased risk for seizure: clozapine  Management: routine monitoring    MNPMP was not checked today: not using controlled substances    Risk Statements:   Treatment Risk- Risks, benefits, alternatives and potential adverse effects have been discussed and are understood.   Safety Risk-Gabriela did not appear to be an imminent safety risk to self or others.     Plan     1) Medications:   - Continue clozapine 100mg BID  - Increase Miralax to 1-2 capfuls daily  - Start Dulcolax 10mg suppository PRN if no bowel movements for 3 days    2) Psychotherapy: None, well-supported by staff at [facility]    3) Next due:  Labs- Continue monthly ANC monitoring for clozapine; will review chart for when next due for antipsychotic monitoring labs  EKG- Routine monitoring is not indicated for current psychotropic medication regimen   Rating scales- AIMS: Score of 1 for minimal LE movement on 10/11/2023    4) Referrals:  Team will start looking to identify outside long-term providers to manage ongoing clozapine    5) Other: none: revcommend whole exome testing for genetic variants associated with neuro developmental delay, seizures, ASD psychosis or catatonia    6) Follow-up: Return to clinic in 2 months     Pertinent Background                                       "             [most recent eval 10/09/23]     Gabriela has a known medical history of developmental delay and microcephaly; she has not had previous genetic workup and minimal family is known due to being adopted. Gabriela first experienced symptoms of ADHD and was treated for this in 2nd grade. At age 14, family noted prodromal symptoms of hyper-fixation on specific movies, TV shows, and other activities. At age 14 while at a [place]  [place] became unresponsive and \"catatonic\" prompting emergent transportation to the hospital and prolonged 2 month hospitalization. Multiple trials of medications during this period with minimal efficacy. Eventually given a trial of clozapine with marked improvement in symptoms. Of note, after this period retained little to no memory of life events prior to age 7, with the exception of some sign language she had learned earlier. Additionally, during this period she lost the ability to perform basic activities she had been able to do previously, including tying her shoes, cooking, and other activities. ECT not used to treat that catatonic episode. More recently there had been concerns for cognitive declines and possible concerns for dementia, but an increase in Keppra may also have been a factor. Has not ever had genetic workup for microcephaly, seizure disorder, and psychiatric concerns. Previously on high doses of clozapine augmented with lithium. Lithium tapered and discontinued in the setting of Cr elevation with no notable destabilization with discontinuation. Clozapine had also been reduced previously due to elevated drug levels on monitoring.    Pertinent items include: psychosis , all , mutiple psychotropic trials , and major medical problems     Subjective     Overall things have been going well according to both Gabriela and her mom, Slime. There have been some concerns about mis-dosing of her clozapine (too high of a dose) due to her residence reporting her running out of mediation " early. Slime is currently working with the residence to address the issue.    Still having trouble with constipation, at times up to 3 days without a bowel movement. Previously had been managed with Miralax, but there have been some concerns that she has not been getting the correct doses of it each day. Has used a suppository in the post and Slime noted she can help Gabriela and staff use one correctly.    No reported recurrence of symptoms concerning for all. No reported SI/HI/AH/VH or other safety concerns at this time.    Have not been able to identify a new long-term clozapine provider at this time.    Recent Psych Symptoms:   Depression:   None  Elevated:  none  Psychosis:  none  Anxiety:   None  Trauma Related:  none  Insomnia:  No  Other:  Yes: Repetitive behavior around watching specific shows; concerns for worsening cognitive impairment.    Current Social History:  Financial/occupational: SSDI, family support  Living situation (partner, children, pets, etc): Lives with 3 roommates and 2 staff members at [facility] since age 20  Social/spiritual support: Family, [facility] staff  Feels safe at home: Yes    Pertinent Substance Use:   Alcohol: No   Cannabis: No  Tobacco: No  Caffeine:  Yes - coffee daily   Opioids: No   Narcan Kit current: N/A  Other substances: none    Medical Review of Systems:   Lightheadedness/orthostasis: Has had several falls over the last few months  Headaches: None  GI: none  Sexual health concerns: None    A comprehensive review of systems was performed and is negative other than noted above.    Contraception: No     Mental Status Exam     Alertness: alert  and slow to respond at times  Appearance: adequately groomed - microcephalic  Behavior/Demeanor: cooperative, pleasant, and calm, with  intermittent  eye contact   Speech: increased latency of response and followed by period of more rapid speech  Language: intact  Psychomotor:  Intermittent movements of feet, appear unnoticed by  "patient  Mood: description consistent with euthymia  Affect: blunted but followed with more reactive affect when actively engaged in the conversation; congruent to: mood- yes, content- yes  Thought Process/Associations: unremarkable  Thought Content:  Reports none;  Denies suicidal & violent ideation and delusions  Perception:  Reports none;  Denies hallucinations  Insight: adequate with support from family, limited by known developmental delays  Judgment: adequate for safety  Cognition:  Known pervasive developmental delay  Gait and Station:  Ambulates with support of rollerator for stability     Past Psych Med Trials      Medication Max Dose (mg) Dates / Duration Helpful? DC Reason / Adverse Effects?   clozapine 600mg Age 14 - current Y Dose slowly scaled back due to high serum levels   lithium 300mg Until 06/2023 Y Stopped due to elevated Cr on labs     Mother reports \"MANY different medications\" at time of first episode of psychosis before eventually settling on clozapine, which she has been on since that time.     Vitals   /80   Pulse 98   Wt 63.4 kg (139 lb 12.8 oz)   Pulse Readings from Last 3 Encounters:   11/22/23 98   10/11/23 105   04/24/19 95     Wt Readings from Last 3 Encounters:   11/22/23 63.4 kg (139 lb 12.8 oz)   10/11/23 63.2 kg (139 lb 6.4 oz)   04/24/19 70.4 kg (155 lb 4.8 oz)     BP Readings from Last 3 Encounters:   11/22/23 123/80   10/11/23 125/80   04/24/19 141/85        Medical History     ALLERGIES: Patient has no known allergies.    Patient Active Problem List   Diagnosis    Seizures (H)        Medications     Current Outpatient Medications   Medication Sig Dispense Refill    calcium carbonate (OS-SHARMIN 500 MG [place]. CA) 500 MG tablet Take 500 mg by mouth daily      cloZAPine (CLOZARIL) 100 MG tablet Take 1 tablet (100 mg) by mouth 2 times daily 180 tablet 2    Cyanocobalamin (B-12 PO) Take by mouth daily      Fluticasone Propionate, Inhal, (FLOVENT IN) Inhale 1 puff into the lungs " 2 times daily      levETIRAcetam (KEPPRA) 1000 MG tablet Take 1 tablet (1,000 mg) by mouth every evening 90 tablet 3    levETIRAcetam (KEPPRA) 1000 MG tablet Take 1 tablet (1,000 mg) by mouth 2 times daily 180 tablet 3    levETIRAcetam (KEPPRA) 750 MG tablet Take 1 tablet (750 mg) by mouth every morning 90 tablet 3    magnesium oxide 400 MG CAPS Take 1 capsule by mouth daily          Labs and Data         10/11/2023     1:56 PM   PROMIS-10 Total Score w/o Sub Scores   PROMIS TOTAL - SUBSCORES 31         10/11/2023     1:56 PM   CAGE-AID Total Score   Total Score 0   Total Score MyChart 0 (A total score of 2 or greater is considered clinically significant)         10/11/2023     1:36 PM   PHQ-9 SCORE   PHQ-9 Total Score MyChart 0   PHQ-9 Total Score 0          No data to display                Liver/Kidney Function, TSH Metabolic Blood counts   No lab results found.  No lab results found. No lab results found.  No lab results found.  No lab results found. No lab results found.        PROVIDER: Clay Berry MD    Level of Medical Decision Making:   - At least 1 chronic problem that is not stable  - Engaged in prescription drug management during visit (discussed any medication benefits, side effects, alternatives, etc.)

## 2023-11-22 NOTE — TELEPHONE ENCOUNTER
Received one page from pharmacy d, dispensed report on the Clozapine 100 mg.    Put copy in Dr. Berry's folder up front . Sent to scan and held in nurse triage until scan in .    Ladan Lawton on 11/22/2023 at 11:56 AM

## 2023-12-18 NOTE — PROGRESS NOTES
Virtual Visit Details  Type of service:  Video Visit   Video Start Time: 1:09 PM  Video End Time: 1:32 PM  Originating Location (pt. Location): Home  Distant Location (provider location):  On-site  Platform used for Video Visit: [facility]  MEDICAL PROGRESS NOTE     CARE TEAM:    PCP- Melvina Balbuena  Therapist- None  Neuro- Dr. Seals    Gabriela is a 43 year old who uses the pronouns she, her, hers.      Diagnoses     Schizoaffective disorder, bipolar type    Other:  Pervasive developmental disorder  Seizure disorder  Microcephaly     Assessment     Gabriela was seen today for follow-up and bridging of care for clozapine management following the FPC of her long-term clozapine provider, Dr. Spaulding. Issues discussed are included below:    - Schizoaffective disorder, bipolar type: Per report from Gabriela's mother, Slime, her symptoms have been well-controlled with her current regimen of clozapine 100mg BID. No issues with medication administration in the interim. No changes to management at this time.    - Pervasive developmental delay/microcephaly/Seizure disorder: Per mother's report, has not ever had genetic screening done in the past. Additionally, recent notes indicate a more recent decline in cognition and function. From review of records this may have coincided with an increase in her Keppra dose following a seizure around 1 year ago. Given this potential decline, known developmental delay, and potentially abrupt onset of full catatonic symptoms at age 14 do raise some concern for some type of genetic syndrome. Could benefit from collaboration between neurology, PCP, and potentially a genetics specialist for a more nuanced evaluation of condition and potential progression. At present, however, symptoms appear stable and unchanged from initial point of assessment.     - Constipation 2/2 clozapine:  Constipation notably improved with addition of prune juice and now having bowel movements regularly. No changes to constipation management plan at this time.    Psychotropic Drug Interactions:  [PSYCHCLINICDDI]  Increased risk for seizure: clozapine  Management: routine monitoring    MNPMP was not checked today: not using controlled substances    Risk Statements:   Treatment Risk- Risks, benefits, alternatives and potential adverse effects have been discussed and are understood.   Safety Risk-Gabriela did not appear to be an imminent safety risk to self or others.     Plan     1) Medications:   - Continue clozapine 100mg BID  - Continue Miralax to 1-2 capfuls daily  - Continue Dulcolax 10mg suppository PRN if no bowel movements for 3 days  - Continue daily glass of prune juice for constipation    2) Psychotherapy: None, well-supported by staff at [facility]    3) Next due:  Labs- Continue monthly ANC monitoring for clozapine; will review chart for when next due for antipsychotic monitoring labs  EKG- Routine monitoring is not indicated for current psychotropic medication regimen   Rating scales- AIMS: Score of 1 for minimal LE movement on 10/11/2023    4) Referrals:  Team will start looking to identify outside long-term providers to manage ongoing clozapine    5) Other: none: may consider whole exome testing for genetic variants associated with neuro developmental delay, seizures, ASD psychosis or catatonia in the future if noting deterioration in function    6) Follow-up: Return to clinic in 2 months with new resident on BCT service     Pertinent Background                                                   [most recent eval 10/09/23]     Gabriela has a known medical history of developmental delay and microcephaly; she has not had previous genetic workup and minimal family is known due to being adopted. Gabriela first experienced symptoms of ADHD and was treated for this in 2nd grade. At age 14, family noted prodromal symptoms  "of hyper-fixation on specific movies, TV shows, and other activities. At age 14 while at a Girl  camp became unresponsive and \"catatonic\" prompting emergent transportation to the hospital and prolonged 2 month hospitalization. Multiple trials of medications during this period with minimal efficacy. Eventually given a trial of clozapine with marked improvement in symptoms. Of note, after this period retained little to no memory of life events prior to age 7, with the exception of some sign language she had learned earlier. Additionally, during this period she lost the ability to perform basic activities she had been able to do previously, including tying her shoes, cooking, and other activities. ECT not used to treat that catatonic episode. More recently there had been concerns for cognitive declines and possible concerns for dementia, but an increase in Keppra may also have been a factor. Has not ever had genetic workup for microcephaly, seizure disorder, and psychiatric concerns. Previously on high doses of clozapine augmented with lithium. Lithium tapered and discontinued in the setting of Cr elevation with no notable destabilization with discontinuation. Clozapine had also been reduced previously due to elevated drug levels on monitoring.    Pertinent items include: psychosis , all , mutiple psychotropic trials , and major medical problems     Subjective     Prune juice has been \"working like a charm\". Having bowel movements regularly with no concerns for constipation at this time per Gabriela's report.    Has been cleaning a lot and re-watching [employer] right now. Had her 43th birthday on [birth year] and got a nice present from her Mom, which they talked about during today's appointment.     No major issues lately, no areas of concern. Symptoms have remained well-controlled with no evidence of recurrence of psychosis.     No reported side effects of medication including muscle stiffness, drooling, or involuntary " "movements.    Current Social History:  Financial/occupational: SSDI, family support  Living situation: Lives with 3 roommates and 2 staff members at [facility] since age 20  Social/spiritual support: Family, [facility] staff    Pertinent Substance Use:   Alcohol: No   Cannabis: No  Tobacco: No  Caffeine:  Yes - coffee daily     Medical Review of Systems:   Will check in about medical concerns at next visit    Contraception: No     Mental Status Exam     Alertness: alert  and slow to respond at times  Appearance: adequately groomed - microcephalic  Behavior/Demeanor: cooperative, pleasant, and calm, with  intermittent  eye contact   Speech: increased latency of response and followed by period of more rapid speech  Language: intact  Psychomotor: normal or unremarkable  Mood: description consistent with euthymia  Affect: blunted but followed with more reactive affect when actively engaged in the conversation; congruent to: mood- yes, content- yes  Thought Process/Associations: unremarkable  Thought Content:  Reports none;  Denies suicidal & violent ideation and delusions  Perception:  Reports none;  Denies hallucinations  Insight: adequate with support from family, limited by known developmental delays  Judgment: adequate for safety  Cognition:  Known pervasive developmental delay  Gait and Station: N/A (telehealth)     Past Psych Med Trials      Medication Max Dose (mg) Dates / Duration Helpful? DC Reason / Adverse Effects?   clozapine 600mg Age 14 - current Y Dose slowly scaled back due to high serum levels   lithium 300mg Until 06/2023 Y Stopped due to elevated Cr on labs     Mother reports \"MANY different medications\" at time of first episode of psychosis before eventually settling on clozapine, which she has been on since that time.     Vitals   There were no vitals taken for this visit.  Pulse Readings from Last 3 Encounters:   11/22/23 98   10/11/23 105   04/24/19 95     Wt Readings from Last 3 Encounters: "   11/22/23 63.4 kg (139 lb 12.8 oz)   10/11/23 63.2 kg (139 lb 6.4 oz)   04/24/19 70.4 kg (155 lb 4.8 oz)     BP Readings from Last 3 Encounters:   11/22/23 123/80   10/11/23 125/80   04/24/19 141/85        Medical History     ALLERGIES: Patient has no known allergies.    Patient Active Problem List   Diagnosis    Seizures (H)        Medications     Current Outpatient Medications   Medication Sig Dispense Refill    bisacodyl (DULCOLAX) 10 MG suppository Place 1 suppository (10 mg) rectally daily as needed for constipation - to be used if 3 days without a bowel movement 30 suppository 2    cloZAPine (CLOZARIL) 100 MG tablet Take 1 tablet (100 mg) by mouth 2 times daily 60 tablet 2    polyethylene glycol (MIRALAX) 17 GM/Dose powder Take 17-34 g (1-2 Capfuls) by mouth daily 850 g 2    calcium carbonate (OS-SHARMIN 500 MG [place]. CA) 500 MG tablet Take 500 mg by mouth daily      Cyanocobalamin (B-12 PO) Take by mouth daily      Fluticasone Propionate, Inhal, (FLOVENT IN) Inhale 1 puff into the lungs 2 times daily      levETIRAcetam (KEPPRA) 1000 MG tablet Take 1 tablet (1,000 mg) by mouth every evening 90 tablet 3    levETIRAcetam (KEPPRA) 1000 MG tablet Take 1 tablet (1,000 mg) by mouth 2 times daily 180 tablet 3    levETIRAcetam (KEPPRA) 750 MG tablet Take 1 tablet (750 mg) by mouth every morning 90 tablet 3    magnesium oxide 400 MG CAPS Take 1 capsule by mouth daily          Labs and Data         10/11/2023     1:56 PM   PROMIS-10 Total Score w/o Sub Scores   PROMIS TOTAL - SUBSCORES 31         10/11/2023     1:56 PM   CAGE-AID Total Score   Total Score 0   Total Score MyChart 0 (A total score of 2 or greater is considered clinically significant)         10/11/2023     1:36 PM 12/20/2023     1:04 PM   PHQ-9 SCORE   PHQ-9 Total Score MyChart 0 3 (Minimal depression)   PHQ-9 Total Score 0 3          No data to display                Liver/Kidney Function, TSH Metabolic Blood counts   No lab results found.  No lab results  found. No lab results found.  No lab results found.  No lab results found. No lab results found.        PROVIDER: Clay Berry MD    Level of Medical Decision Making:   - At least 1 chronic problem that is not stable  - Engaged in prescription drug management during visit (discussed any medication benefits, side effects, alternatives, etc.)    Patient staffed in clinic with Dr. Bales who will sign the note.  Supervisor is Dr. Ogden.

## 2023-12-20 ENCOUNTER — VIRTUAL VISIT (OUTPATIENT)
Dept: PSYCHIATRY | Facility: CLINIC | Age: 43
End: 2023-12-20
Attending: PSYCHIATRY & NEUROLOGY
Payer: MEDICARE

## 2023-12-20 DIAGNOSIS — K59.03 DRUG-INDUCED CONSTIPATION: ICD-10-CM

## 2023-12-20 DIAGNOSIS — F25.0 SCHIZOAFFECTIVE DISORDER, BIPOLAR TYPE (H): Primary | ICD-10-CM

## 2023-12-20 DIAGNOSIS — F84.9 PERVASIVE DEVELOPMENTAL DISORDER: ICD-10-CM

## 2023-12-20 PROCEDURE — 99214 OFFICE O/P EST MOD 30 MIN: CPT | Mod: VID

## 2023-12-20 RX ORDER — CLOZAPINE 100 MG/1
100 TABLET ORAL 2 TIMES DAILY
Qty: 60 TABLET | Refills: 2 | Status: SHIPPED | OUTPATIENT
Start: 2023-12-20 | End: 2024-02-22

## 2023-12-20 RX ORDER — BISACODYL 10 MG
10 SUPPOSITORY, RECTAL RECTAL DAILY PRN
Qty: 30 SUPPOSITORY | Refills: 2 | Status: SHIPPED | OUTPATIENT
Start: 2023-12-20

## 2023-12-20 RX ORDER — POLYETHYLENE GLYCOL 3350 17 G/17G
1-2 POWDER, FOR SOLUTION ORAL DAILY
Qty: 850 G | Refills: 2 | Status: SHIPPED | OUTPATIENT
Start: 2023-12-20

## 2023-12-20 ASSESSMENT — PATIENT HEALTH QUESTIONNAIRE - PHQ9
SUM OF ALL RESPONSES TO PHQ QUESTIONS 1-9: 3
SUM OF ALL RESPONSES TO PHQ QUESTIONS 1-9: 3
10. IF YOU CHECKED OFF ANY PROBLEMS, HOW DIFFICULT HAVE THESE PROBLEMS MADE IT FOR YOU TO DO YOUR WORK, TAKE CARE OF THINGS AT HOME, OR GET ALONG WITH OTHER PEOPLE: NOT DIFFICULT AT ALL

## 2023-12-20 NOTE — PATIENT INSTRUCTIONS
**For crisis resources, please see the information at the end of this document**   Patient Education    Thank you for coming to the [facility] [place] [facility].     Lab Testing:  If you had lab testing today and your results are reassuring or normal they will be mailed to you or sent through Scout[employer] within 7 days. If the lab tests need quick action we will call you with the results. The phone number we will call with results is # [phone]. If this is not the best number please call our clinic and change the number.     Medication Refills:  If you need any refills please call your pharmacy and they will contact us. Our fax number for refills is [phone].   Three business days of notice are needed for general medication refill requests.   Five business days of notice are needed for controlled substance refill requests.   If you need to change to a different pharmacy, please contact the new pharmacy directly. The new pharmacy will help you get your medications transferred.     Contact Us:  Please call [phone] during business hours (8-5:00 M-F).   If you have medication related questions after clinic hours, or on the weekend, please call [phone].     Financial Assistance [phone]   Medical Records [phone]       MENTAL HEALTH CRISIS RESOURCES:  For a emergency help, please call 911 or go to the nearest Emergency Department.     Emergency Walk-In Options:   EmPATH Unit @ [facility] ([place]): [phone] - Specialized mental health emergency area designed to be calming  [facility] ([place]): [phone]  [facility] ([place]): [phone]  [facility]): [phone]    [facility] Crisis Information:   [place]: [phone]  [place]: [phone]  Jana (NASIMA) - Adult: [phone]     Child: [phone]  Jarod - Adult: [phone]     Child: [phone]  [place]:  [phone]  List of all [employer] resources:   https://[place].gov/dhs/people-we-serve/adults/health-care/mental-health/resources/crisis-contacts.jsp    National Crisis Information:   Crisis Text Line: Text  [place]  to 000123  Suicide & Crisis Lifeline: 988  National Suicide Prevention Lifeline: 6-837-413-TALK (1-357.131.5713)       For online chat options, visit https://suicidepreventionlifeline.org/chat/  Poison Control Center: 8-741-229-5854  Trans Lifeline: 7-567-481-3651 - Hotline for transgender people of all ages  The Mino Project: 9-871-862-2763 - Hotline for LGBT youth     For Non-Emergency Support:   Fast Tracker: Mental Health & Substance Use Disorder Resources -   https://www.[employer].org/

## 2023-12-20 NOTE — NURSING NOTE
Is the patient currently in the state of [place]? YES    Visit mode:VIDEO    If the visit is dropped, the patient can be reconnected by: VIDEO VISIT: Send to e-mail at: [email or web address]    Will anyone else be joining the visit? YES already in video with patient  (If patient encounters technical issues they should call [phone]    How would you like to obtain your AVS? Mail a copy    Are changes needed to the allergy or medication list? No    Reason for visit: RECHECK    Kusum Nelson VVF    Patient did not know bp, weight or height

## 2023-12-22 ENCOUNTER — TELEPHONE (OUTPATIENT)
Dept: NEUROLOGY | Facility: CLINIC | Age: 43
End: 2023-12-22
Payer: MEDICARE

## 2023-12-22 NOTE — TELEPHONE ENCOUNTER
Health Call Center    Phone Message    May a detailed message be left on voicemail: no     Reason for Call: Medication Question or concern regarding medication   Prescription Clarification  Name of Medication: levETIRAcetam (KEPPRA) 750 MG tablet   Prescribing Provider: Dr. Seals   Pharmacy: [facility] [address]   What on the order needs clarification? Patt calling from the pharmacy to request a call back due to needing clarification for dose and directions of this medication.    Patt stated that Gabriela is out of this medication.    Action Taken: Message routed to:  Other: [facility]    Travel Screening: Not Applicable

## 2024-01-09 ENCOUNTER — TELEPHONE (OUTPATIENT)
Dept: PSYCHIATRY | Facility: CLINIC | Age: 44
End: 2024-01-09
Payer: MEDICARE

## 2024-01-09 DIAGNOSIS — Z79.899 ENCOUNTER FOR LONG-TERM (CURRENT) USE OF MEDICATIONS: Primary | ICD-10-CM

## 2024-01-09 NOTE — TELEPHONE ENCOUNTER
This clinic has not received CBC results for clozapine monitoring purposes for the month of December. Placed a call to patient's mother and legal guardian. She agreed to contact the patient's GH and make sure they completed her monthly labs and will have the results faxed to the clinic. Clinic fax number provided.    Alma Joyner RN on 1/9/2024 at 9:16 AM

## 2024-01-11 NOTE — TELEPHONE ENCOUNTER
[facility]    Phone Message    May a detailed message be left on voicemail: yes     Reason for Call: Medication Question or concern regarding medication   Prescription Clarification  Name of Medication: clozapine / blood draw   Prescribing Provider: ada   Pharmacy: [facility] [place], [address]     What on the order needs clarification?   [facility] calling. Writer could not find consent to communicate on file, requests that  remind pt guardian to complete consent to communicate or WOLF for clinic. Staff does not think that patient had blood draw last month, and they tried to complete the draw this month but were told there were not current orders for that draw.      Action Taken: Message routed to:  Other: nursing pool    Travel Screening: Not Applicable

## 2024-01-12 DIAGNOSIS — Z79.899 ENCOUNTER FOR LONG-TERM (CURRENT) USE OF MEDICATIONS: Primary | ICD-10-CM

## 2024-01-12 NOTE — TELEPHONE ENCOUNTER
Orders send to [facility] fax [phone].  will make an appointment for patient to get drawn.

## 2024-01-23 ENCOUNTER — TELEPHONE (OUTPATIENT)
Dept: PSYCHIATRY | Facility: CLINIC | Age: 44
End: 2024-01-23
Payer: MEDICARE

## 2024-01-23 LAB
ABS BASOPHILS: 0 CELLS/MM3
ABS EOSINOPHILS: 0 CELLS/MM3
ABS LYMPHOCYTES: 1.7 CELLS/MM3
ABS NEUTROPHILS: 3 CELLS/MM3
BASOPHILS NFR BLD AUTO: 0.3 %
EOSINOPHIL NFR BLD AUTO: 0 %
ERYTHROCYTE [DISTWIDTH] IN BLOOD BY AUTOMATED COUNT: 13.8 %
HCT VFR BLD AUTO: 33.5 %
HEMOGLOBIN: 11.3 G/DL
LYMPHOCYTES NFR BLD AUTO: 32.8 %
MCH RBC QN AUTO: 31.5 PG
MCHC RBC AUTO-ENTMCNC: 33.8 G/DL
MCV RBC AUTO: 93 FL
MONOCYTES # BLD AUTO: 0.5 10(3)/UL
MONOCYTES NFR BLD AUTO: 9.9 %
NEUTROPHILS NFR BLD AUTO: 57 %
PLATELETS: 306 X10E3/UL
PMV BLD: 6.3 FL
RBC # BLD AUTO: 3.6 X10E6/UL
WBC # BLD AUTO: 5.2 10^9/L

## 2024-01-23 NOTE — TELEPHONE ENCOUNTER
On 01/19/2024 the patient signed an WOLF authorizing the release of records from [facility] to Erick Nash and Jesus Toure (Fax - [phone]).  I sent the document to medical records and scanning on 01/23/2024.    - Chapin Caputo, Visit Facilitator

## 2024-01-23 NOTE — TELEPHONE ENCOUNTER
For clozapine monitoring purposes:   Current dose: 100mg BID  Laboratory Monitoring: Monthly    -Clozapine REMS guidelines recommend monitoring ANC only.    -Regular monitoring can continue until ANC <1.5   -Per these guidelines ANC is 3.0  -Will notify Dr. Leslie FRENCH updated.

## 2024-01-23 NOTE — TELEPHONE ENCOUNTER
Received results of CBC w/Diff from [facility] drawn on 1/22 at 1406. Results entered into EMR by Dunia Johns LPN on 1/23. Routed to Psych Nurse Pool for review. Document placed in scanning and a copy held in Psychiatry until scanning complete/confirmed

## 2024-02-20 ENCOUNTER — TELEPHONE (OUTPATIENT)
Dept: PSYCHIATRY | Facility: CLINIC | Age: 44
End: 2024-02-20
Payer: MEDICARE

## 2024-02-20 LAB
ABS BASOPHILS: 0 CELLS/MM3
ABS EOSINOPHILS: 0 CELLS/MM3
ABS LYMPHOCYTES: 0.9 CELLS/MM3
ABS NEUTROPHILS: 3.3 CELLS/MM3
BASOPHILS NFR BLD AUTO: 0.4 %
EOSINOPHIL NFR BLD AUTO: 0 %
ERYTHROCYTE [DISTWIDTH] IN BLOOD BY AUTOMATED COUNT: 14.6 %
HCT VFR BLD AUTO: 34.3 %
HEMOGLOBIN: 11.3 G/DL
LYMPHOCYTES NFR BLD AUTO: 18.6 %
MCH RBC QN AUTO: 31 PG
MCHC RBC AUTO-ENTMCNC: 32.8 G/DL
MCV RBC AUTO: 94.5 FL
MONOCYTES # BLD AUTO: 0.5 10(3)/UL
MONOCYTES NFR BLD AUTO: 11 %
NEUTROPHILS NFR BLD AUTO: 70 %
PLATELETS: 282 X10E3/UL
PMV BLD: 6.1 FL
RBC # BLD AUTO: 3.63 X10E6/UL
WBC # BLD AUTO: 4.7 10^9/L

## 2024-02-20 NOTE — TELEPHONE ENCOUNTER
Writer received results of CBC w/Diff/Platelet from [facility]. Writer enter results into patients EMR. Writer routed to provider and Psych Nurse Pool, sent a copy to scanning and a copy was held with writer until scanning complete.

## 2024-02-20 NOTE — TELEPHONE ENCOUNTER
For clozapine monitoring purposes:   Current dose: 100 mg twice daily  Laboratory Monitoring: monthly    -Clozapine REMS guidelines recommend monitoring ANC only.    -Regular monitoring can continue until ANC <1.5   -Per these guidelines ANC is WNL  -Will notify Dr. Berry  [facility]     Abs Neutrophils  1.8 - 9.2 cells/mm3 3.3

## 2024-02-20 NOTE — PROGRESS NOTES
[facility] Neurology Follow Up Visit    Gabriela Juarez MRN# [id number]   Age: 42 year old YOB: 1980     Brief history of symptoms: The patient was initially seen in neurologic consultation on 1/28/2021 for evaluation of epilepsy. Please see the comprehensive neurologic consultation note from that date in the Epic records for details.     Interval history:   Patient has been doing well since last visit. She is tolerating the Keppra without side effect. She hasn't had any additional seizures.       Past Medical History:     Patient Active Problem List   Diagnosis    Seizures (H)     No past medical history on file.     Past Surgical History:   No past surgical history on file.     Social History:     Social History     Tobacco Use    Smoking status: Former     Types: Cigarettes    Smokeless tobacco: Never   Vaping Use    Vaping Use: Never used   Substance Use Topics    Alcohol use: No        Family History:   No family history on file.     Medications:     Current Outpatient Medications   Medication Sig    bisacodyl (DULCOLAX) 10 MG suppository Place 1 suppository (10 mg) rectally daily as needed for constipation - to be used if 3 days without a bowel movement    calcium carbonate (OS-SHARMIN 500 MG [place]. CA) 500 MG tablet Take 500 mg by mouth daily    cloZAPine (CLOZARIL) 100 MG tablet Take 1 tablet (100 mg) by mouth 2 times daily    Cyanocobalamin (B-12 PO) Take by mouth daily    Fluticasone Propionate, Inhal, (FLOVENT IN) Inhale 1 puff into the lungs 2 times daily    levETIRAcetam (KEPPRA) 1000 MG tablet Take 1 tablet (1,000 mg) by mouth every evening    levETIRAcetam (KEPPRA) 1000 MG tablet Take 1 tablet (1,000 mg) by mouth 2 times daily    levETIRAcetam (KEPPRA) 750 MG tablet Take 1 tablet (750 mg) by mouth every morning    magnesium oxide 400 MG CAPS Take 1 capsule by mouth daily    polyethylene glycol (MIRALAX) 17 GM/Dose powder Take 17-34 g (1-2 Capfuls) by mouth daily     No current facility-administered  medications for this visit.        Allergies:   No Known Allergies     Review of Systems:   As above     Physical Exam:   Vitals: There were no vitals taken for this visit.   No examination given nature of virtual visit     Data reviewed on previous visits    Imaging:  MRI brain 2005   IMPRESSION:    Normal MRI of the brain.      Procedures:  EEG 2007   CONCLUSION: This is an abnormal routine EEG in a 26-year-old female   patient during wakefulness and drowsiness. There were frequent   intermittent bifrontal spikes and slow waves throughout the study which   were not increased in frequency during photic stimulation. This finding is   consistent with a seizure disorder.    Pertinent Investigations since last visit:   None         Assessment and Plan:   Assessment:  Gabriela Juarez is a 42 year old female who presents today for follow-up of epilepsy. She was initially seen in January 2021. Patient previously followed with Dr Jackson. Unclear from history when seizures first started. Her last seizure was in January 2023 and prior to that patient hasn't had a seizure in at least 10 years. Previous EEG from 2007 showed intermittent bifrontal spikes. MRI brain was normal in 2005. She is tolerating the same dosage of Keppra and we will continue it as below.       Plan:  - Continue Keppra 1000 mg AM and 750 mg evening    Follow up in Neurology clinic in 1 year or earlier as needed should new concerns arise.    Santosh Seals MD   of Neurology  [facility]

## 2024-02-21 ENCOUNTER — VIRTUAL VISIT (OUTPATIENT)
Dept: PSYCHIATRY | Facility: CLINIC | Age: 44
End: 2024-02-21
Attending: STUDENT IN AN ORGANIZED HEALTH CARE EDUCATION/TRAINING PROGRAM
Payer: MEDICARE

## 2024-02-21 ENCOUNTER — VIRTUAL VISIT (OUTPATIENT)
Dept: NEUROLOGY | Facility: CLINIC | Age: 44
End: 2024-02-21
Payer: MEDICARE

## 2024-02-21 DIAGNOSIS — R56.9 SEIZURES (H): Primary | ICD-10-CM

## 2024-02-21 DIAGNOSIS — F25.0 SCHIZOAFFECTIVE DISORDER, BIPOLAR TYPE (H): ICD-10-CM

## 2024-02-21 DIAGNOSIS — F25.0 SCHIZOAFFECTIVE DISORDER, BIPOLAR TYPE (H): Primary | ICD-10-CM

## 2024-02-21 DIAGNOSIS — F84.9 PERVASIVE DEVELOPMENTAL DISORDER: ICD-10-CM

## 2024-02-21 PROCEDURE — 99213 OFFICE O/P EST LOW 20 MIN: CPT | Mod: 95 | Performed by: INTERNAL MEDICINE

## 2024-02-21 PROCEDURE — 99214 OFFICE O/P EST MOD 30 MIN: CPT | Mod: 95

## 2024-02-21 RX ORDER — PANTOPRAZOLE SODIUM 40 MG/1
1 TABLET, DELAYED RELEASE ORAL
COMMUNITY
Start: 2023-11-01

## 2024-02-21 RX ORDER — FERROUS SULFATE 325(65) MG
325 TABLET ORAL
COMMUNITY
Start: 2023-12-15

## 2024-02-21 NOTE — PROGRESS NOTES
Gabriela is a 43 year old who is being evaluated via a billable video visit.      How would you like to obtain your AVS? Mail a copy  If the video visit is dropped, the invitation should be resent by: Text to cell phone: [phone]  Will anyone else be joining your video visit? Yes: Jeanette. How would they like to receive their invitation? Text to cell phone: [phone]        Video-Visit Details    Type of service:  Video Visit   Video duration: 5 minutes    Originating Location (pt. Location): Home    Distant Location (provider location):  On-site  Platform used for Video Visit: Cequint

## 2024-02-21 NOTE — PROGRESS NOTES
Virtual Visit Details    Type of service:  Video Visit   Video Start Time:  2:40  Video End Time: 3:20    Originating Location (pt. Location): Long term Care    Distant Location (provider location):  On-site  Platform used for Video Visit: Vivek

## 2024-02-21 NOTE — LETTER
2/21/2024         RE: Gabriela Juarez  [address] [facility] [address]        Dear Colleague,    Thank you for referring your patient, Gabriela Juarez, to the [facility] [place]. Please see a copy of my visit note below.    [facility] Neurology Follow Up Visit    Gabriela Juarez MRN# [id number]   Age: 42 year old YOB: 1980     Brief history of symptoms: The patient was initially seen in neurologic consultation on 1/28/2021 for evaluation of epilepsy. Please see the comprehensive neurologic consultation note from that date in the Epic records for details.     Interval history:   Patient has been doing well since last visit. She is tolerating the Keppra without side effect. She hasn't had any additional seizures.       Past Medical History:     Patient Active Problem List   Diagnosis     Seizures (H)     No past medical history on file.     Past Surgical History:   No past surgical history on file.     Social History:     Social History     Tobacco Use     Smoking status: Former     Types: Cigarettes     Smokeless tobacco: Never   Vaping Use     Vaping Use: Never used   Substance Use Topics     Alcohol use: No        Family History:   No family history on file.     Medications:     Current Outpatient Medications   Medication Sig     bisacodyl (DULCOLAX) 10 MG suppository Place 1 suppository (10 mg) rectally daily as needed for constipation - to be used if 3 days without a bowel movement     calcium carbonate (OS-SHARMIN 500 MG [place]. CA) 500 MG tablet Take 500 mg by mouth daily     cloZAPine (CLOZARIL) 100 MG tablet Take 1 tablet (100 mg) by mouth 2 times daily     Cyanocobalamin (B-12 PO) Take by mouth daily     Fluticasone Propionate, Inhal, (FLOVENT IN) Inhale 1 puff into the lungs 2 times daily     levETIRAcetam (KEPPRA) 1000 MG tablet Take 1 tablet (1,000 mg) by mouth every evening     levETIRAcetam (KEPPRA) 1000 MG tablet Take 1 tablet (1,000 mg) by mouth 2 times daily      levETIRAcetam (KEPPRA) 750 MG tablet Take 1 tablet (750 mg) by mouth every morning     magnesium oxide 400 MG CAPS Take 1 capsule by mouth daily     polyethylene glycol (MIRALAX) 17 GM/Dose powder Take 17-34 g (1-2 Capfuls) by mouth daily     No current facility-administered medications for this visit.        Allergies:   No Known Allergies     Review of Systems:   As above     Physical Exam:   Vitals: There were no vitals taken for this visit.   No examination given nature of virtual visit     Data reviewed on previous visits    Imaging:  MRI brain 2005   IMPRESSION:    Normal MRI of the brain.      Procedures:  EEG 2007   CONCLUSION: This is an abnormal routine EEG in a 26-year-old female   patient during wakefulness and drowsiness. There were frequent   intermittent bifrontal spikes and slow waves throughout the study which   were not increased in frequency during photic stimulation. This finding is   consistent with a seizure disorder.    Pertinent Investigations since last visit:   None         Assessment and Plan:   Assessment:  Gabriela Juarez is a 42 year old female who presents today for follow-up of epilepsy. She was initially seen in January 2021. Patient previously followed with Dr Jackson. Unclear from history when seizures first started. Her last seizure was in January 2023 and prior to that patient hasn't had a seizure in at least 10 years. Previous EEG from 2007 showed intermittent bifrontal spikes. MRI brain was normal in 2005. She is tolerating the same dosage of Keppra and we will continue it as below.       Plan:  - Continue Keppra 1000 mg AM and 750 mg evening    Follow up in Neurology clinic in 1 year or earlier as needed should new concerns arise.    Santosh Seals MD   of Neurology  [facility]    Gabriela is a 43 year old who is being evaluated via a billable video visit.      How would you like to obtain your AVS? Mail a copy  If the video visit is dropped, the  invitation should be resent by: Text to cell phone: [phone]  Will anyone else be joining your video visit? Yes: Jeanette. How would they like to receive their invitation? Text to cell phone: [phone]  {If patient encounters technical issues they should call [phone] :[id number]}      Video-Visit Details    Type of service:  Video Visit   Video duration: 5 minutes    Originating Location (pt. Location): Home  {PROVIDER LOCATION On-site should be selected for visits conducted from your clinic location or adjoining [facility] hospital, academic office, or other nearby [facility] building. Off-site should be selected for all other provider locations, including home:[id number]}  Distant Location (provider location):  On-site  Platform used for Video Visit: Vivek       Again, thank you for allowing me to participate in the care of your patient.        Sincerely,        Santosh Seals MD

## 2024-02-21 NOTE — PATIENT INSTRUCTIONS
**For crisis resources, please see the information at the end of this document**   Patient Education    Thank you for coming to the [facility] [place] [facility].    Lab Testing:  If you had lab testing today and your results are reassuring or normal they will be mailed to you or sent through [employer] within 7 days. If the lab tests need quick action we will call you with the results. The phone number we will call with results is # [phone] (home) [phone] (work). If this is not the best number please call our clinic and change the number.    Medication Refills:  If you need any refills please call your pharmacy and they will contact us. Our fax number for refills is [phone]. Please allow three business for refill processing. If you need to  your refill at a new pharmacy, please contact the new pharmacy directly. The new pharmacy will help you get your medications transferred.     Scheduling:  If you have any concerns about today's visit or wish to schedule another appointment please call our office during normal business hours [phone] (8-5:00 M-F)    Contact Us:  Please call [phone] during business hours (8-5:00 M-F).  If after clinic hours, or on the weekend, please call  [phone].    Financial Assistance [phone]  [facility] Billing [phone]  Central Billing Office, [facility]: [phone]  [place] Billing [phone]  Medical Records [phone]  [facility] Patient Bill of Rights [email or web address]       MENTAL HEALTH CRISIS RESOURCES:  For a emergency help, please call 911 or go to the nearest Emergency Department.     Emergency Walk-In Options:   EmPATH Unit @ [facility] ([place]): [phone] - Specialized mental health emergency area designed to be calming  [facility] ([place]): [phone]  [facility]  ([place]): [phone]  [facility] ([place]): [phone]    [facility] Crisis Information:   [place]: [phone]  [place]: [phone]  [place] (NASIMA) - Adult: [phone]     Child: [phone]  Jarod - Adult: [phone]     Child: [phone]  [place]: [phone]  List of all [facility] resources:   https://[place].gov/dhs/people-we-serve/adults/health-care/mental-health/resources/crisis-contacts.jsp    National Crisis Information:   Crisis Text Line: Text  [place]  to 967794  National Suicide Prevention Lifeline: 9-457-247-TALK (1-470.380.6478)       For online chat options, visit https://suicidepreventionlifeline.org/chat/  Poison Control Center: 1-996.825.5789  Trans Lifeline: 1-822.954.7216 - Hotline for transgender people of all ages  The Mino Project: 4-577-514-4390 - Hotline for LGBT youth     For Non-Emergency Support:   Fast Tracker: Mental Health & Substance Use Disorder Resources -   https://www."[employer]trackFluencrn.org/       Again thank you for choosing [facility] [place] [facility] and please let us know how we can best partner with you to improve you and your family's health.    You may be receiving a survey regarding this appointment. We would love to have your feedback, both positive and negative. The survey is done by an external company, so your answers are anonymous.

## 2024-02-21 NOTE — NURSING NOTE
Is the patient currently in the state of [place]? YES    Visit mode:VIDEO    If the visit is dropped, the patient can be reconnected by: VIDEO VISIT: Text to cell phone:   Telephone Information:   Mobile [phone]       Will anyone else be joining the visit? NO  (If patient encounters technical issues they should call [phone])    How would you like to obtain your AVS? Mail a copy    Are changes needed to the allergy or medication list? No    Reason for visit: No chief complaint on file.    Jess CLINEF

## 2024-02-21 NOTE — PROGRESS NOTES
Virtual Visit Details  Type of service:  Video Visit   Video Start Time: 2:40 PM  Video End Time: 3:20 PM  Originating Location (pt. Location): Home  Distant Location (provider location):  On-site  Platform used for Video Visit: [facility]  MEDICAL PROGRESS NOTE     CARE TEAM:    PCP- Melvina Balbuena  Therapist- None  Neuro- Dr. Seals    Gabriela is a 43 year old who uses the pronouns she, her, hers.      Diagnoses     Schizoaffective disorder, bipolar type    Other:  Pervasive developmental disorder  Seizure disorder  Microcephaly     Assessment     Gabriela was seen today for follow-up and bridging of care for clozapine management following the [facility] of her long-term clozapine provider, Dr. Spaulding. Issues discussed are included below:    - Schizoaffective disorder, bipolar type: Per report from Gabriela's , caregiver,John, her symptoms have been well-controlled with her current regimen of clozapine 100mg BID. No issues with medication administration in the interim. No changes to management at this time.    - Pervasive developmental delay/microcephaly/Seizure disorder: Per caregiver's report, has not ever had genetic screening done in the past. Additionally, recent notes indicate a more recent decline in cognition and function. From review of records this may have coincided with an increase in her Keppra dose following a seizure around 1 year ago. Given this potential decline, known developmental delay, and potentially abrupt onset of full catatonic symptoms at age 14 do raise some concern for some type of genetic syndrome. Could benefit from collaboration between neurology, PCP, and potentially a genetics specialist for a more nuanced evaluation of condition and potential progression. At present, however, symptoms appear stable and unchanged from initial point of assessment.     - Constipation 2/2  clozapine: Constipation notably improved with addition of prune juice and now having bowel movements regularly. No changes to constipation management plan at this time.    Psychotropic Drug Interactions:  [PSYCHCLINICDDI]  Increased risk for seizure: clozapine  Management: routine monitoring    MNPMP was not checked today: not using controlled substances    Risk Statements:   Treatment Risk- Risks, benefits, alternatives and potential adverse effects have been discussed and are understood.   Safety Risk-Gabriela did not appear to be an imminent safety risk to self or others.     Plan     1) Medications:   - Continue clozapine 100mg BID  - Continue Miralax to 1-2 capfuls daily  - Continue Dulcolax 10mg suppository PRN if no bowel movements for 3 days  - Continue daily glass of prune juice for constipation    2) Psychotherapy: None, well-supported by staff at [facility]    3) Next due:  Labs- Continue monthly ANC monitoring for clozapine; will review chart for when next due for antipsychotic monitoring labs  EKG- Routine monitoring is not indicated for current psychotropic medication regimen   Rating scales- AIMS: Score of 1 for minimal LE movement on 10/11/2023    4) Referrals:  Team will start looking to identify outside long-term providers to manage ongoing clozapine    5) Other: none: may consider whole exome testing for genetic variants associated with neuro developmental delay, seizures, ASD psychosis or catatonia in the future if noting deterioration in function    6) Follow-up: Return to clinic in 3 months with new resident on BCT service     Pertinent Background                                                   [most recent eval 10/09/23]     Gabriela has a known medical history of developmental delay and microcephaly; she has not had previous genetic workup and minimal family is known due to being adopted. Gabriela first experienced symptoms of ADHD and was treated for this in 2nd grade. At age 14, family noted prodromal  "symptoms of hyper-fixation on specific movies, TV shows, and other activities. At age 14 while at [place] became unresponsive and \"catatonic\" prompting emergent transportation to the hospital and prolonged 2 month hospitalization. Multiple trials of medications during this period with minimal efficacy. Eventually given a trial of clozapine with marked improvement in symptoms. Of note, after this period retained little to no memory of life events prior to age 7, with the exception of some sign language she had learned earlier. Additionally, during this period she lost the ability to perform basic activities she had been able to do previously, including tying her shoes, cooking, and other activities. ECT not used to treat that catatonic episode. More recently there had been concerns for cognitive declines and possible concerns for dementia, but an increase in Keppra may also have been a factor. Has not ever had genetic workup for microcephaly, seizure disorder, and psychiatric concerns. Previously on high doses of clozapine augmented with lithium. Lithium tapered and discontinued in the setting of Cr elevation with no notable destabilization with discontinuation. Clozapine had also been reduced previously due to elevated drug levels on monitoring.    Pertinent items include: psychosis , all , mutiple psychotropic trials , and major medical problems     Subjective     Her dad passed away two months ago, but she was able to manage it well. Visited mom during Christmas     Prune juice has been \"working like a charm\". Having bowel movements regularly with no concerns for constipation at this time per Gabriela's report.    Has been cleaning a lot and re-watching [employer] right now. Had her 43th birthday on [birth year] and got a nice present from her Mom, which they talked about during today's appointment.     No major issues lately, no areas of concern. Symptoms have remained well-controlled with no evidence of " "recurrence of psychosis.     No reported side effects of medication including muscle stiffness, drooling, or involuntary movements.    Current Social History:  Financial/occupational: SSDI, family support  Living situation: Lives with 3 roommates and 2 staff members at [facility] since age 20  Social/spiritual support: Family, [facility] staff    Pertinent Substance Use:   Alcohol: No   Cannabis: No  Tobacco: No  Caffeine:  Yes - coffee daily     Medical Review of Systems:   Will check in about medical concerns at next visit    Contraception: No     Mental Status Exam     Alertness: alert  and slow to respond at times  Appearance: adequately groomed - microcephalic  Behavior/Demeanor: cooperative, pleasant, and calm, with  intermittent  eye contact   Speech: increased latency of response and followed by period of more rapid speech  Language: intact  Psychomotor: normal or unremarkable  Mood: description consistent with euthymia  Affect: blunted but followed with more reactive affect when actively engaged in the conversation; congruent to: mood- yes, content- yes  Thought Process/Associations: unremarkable  Thought Content:  Reports none;  Denies suicidal & violent ideation and delusions  Perception:  Reports none;  Denies hallucinations  Insight: adequate with support from family, limited by known developmental delays  Judgment: adequate for safety  Cognition:  Known pervasive developmental delay  Gait and Station: N/A (telehealth)     Past Psych Med Trials      Medication Max Dose (mg) Dates / Duration Helpful? DC Reason / Adverse Effects?   clozapine 600mg Age 14 - current Y Dose slowly scaled back due to high serum levels   lithium 300mg Until 06/2023 Y Stopped due to elevated Cr on labs     Mother reports \"MANY different medications\" at time of first episode of psychosis before eventually settling on clozapine, which she has been on since that time.     Vitals   There were no vitals taken for this visit.  Pulse " Readings from Last 3 Encounters:   11/22/23 98   10/11/23 105   04/24/19 95     Wt Readings from Last 3 Encounters:   11/22/23 63.4 kg (139 lb 12.8 oz)   10/11/23 63.2 kg (139 lb 6.4 oz)   04/24/19 70.4 kg (155 lb 4.8 oz)     BP Readings from Last 3 Encounters:   11/22/23 123/80   10/11/23 125/80   04/24/19 141/85        Medical History     ALLERGIES: Patient has no known allergies.    Patient Active Problem List   Diagnosis    Seizures (H)        Medications     Current Outpatient Medications   Medication Sig Dispense Refill    bisacodyl (DULCOLAX) 10 MG suppository Place 1 suppository (10 mg) rectally daily as needed for constipation - to be used if 3 days without a bowel movement 30 suppository 2    calcium carbonate (OS-SHARMIN 500 MG [place]. [place]) 500 MG tablet Take 500 mg by mouth daily      cloZAPine (CLOZARIL) 100 MG tablet Take 1 tablet (100 mg) by mouth 2 times daily 60 tablet 2    Cyanocobalamin (B-12 PO) Take by mouth daily      Fluticasone Propionate, Inhal, (FLOVENT IN) Inhale 1 puff into the lungs 2 times daily      levETIRAcetam (KEPPRA) 1000 MG tablet Take 1 tablet (1,000 mg) by mouth every evening 90 tablet 3    levETIRAcetam (KEPPRA) 1000 MG tablet Take 1 tablet (1,000 mg) by mouth 2 times daily 180 tablet 3    levETIRAcetam (KEPPRA) 750 MG tablet Take 1 tablet (750 mg) by mouth every morning 90 tablet 3    magnesium oxide 400 MG CAPS Take 1 capsule by mouth daily      polyethylene glycol (MIRALAX) 17 GM/Dose powder Take 17-34 g (1-2 Capfuls) by mouth daily 850 g 2        Labs and Data         10/11/2023     1:56 PM   PROMIS-10 Total Score w/o Sub Scores   PROMIS TOTAL - SUBSCORES 31         10/11/2023     1:56 PM   CAGE-AID Total Score   Total Score 0   Total Score MyChart 0 (A total score of 2 or greater is considered clinically significant)         10/11/2023     1:36 PM 12/20/2023     1:04 PM   PHQ-9 SCORE   PHQ-9 Total Score MyChart 0 3 (Minimal depression)   PHQ-9 Total Score 0 3          No data to  display                Liver/Kidney Function, TSH Metabolic Blood counts   No lab results found.  No lab results found. No lab results found.  No lab results found.  No lab results found. Recent Labs   Lab Test 02/20/24  0000   WBC 4.7   HGB 11.3   HCT 34.3   MCV 94.5           PROVIDER: Ashly Nelson MD    Level of Medical Decision Making:   - At least 1 chronic problem that is not stable  - Engaged in prescription drug management during visit (discussed any medication benefits, side effects, alternatives, etc.)    Patient staffed in clinic with Dr. Balbuena who will sign the note.  Supervisor is Dr. Simon.

## 2024-02-22 RX ORDER — CLOZAPINE 100 MG/1
100 TABLET ORAL 2 TIMES DAILY
Qty: 60 TABLET | Refills: 2 | Status: SHIPPED | OUTPATIENT
Start: 2024-02-22 | End: 2024-08-06

## 2024-02-22 NOTE — TELEPHONE ENCOUNTER
Medication requested: cloZAPine (CLOZARIL) 100 MG tablet   Last refilled: 12/20/23  Qty: 60/2      Last seen: 2/21/24  RTC:  Return to clinic in 3 months with new resident on BCT service   Cancel: 0  No-show: 0  Next appt: 5/22/24    Refill decision:   Refill pended and routed to the provider for review/determination due to   Monitoring..  Last note is not yet signed

## 2024-03-22 ENCOUNTER — TELEPHONE (OUTPATIENT)
Dept: PSYCHIATRY | Facility: CLINIC | Age: 44
End: 2024-03-22
Payer: MEDICARE

## 2024-03-22 LAB
ABS BASOPHILS: 0 CELLS/MM3
ABS EOSINOPHILS: 0 CELLS/MM3
ABS LYMPHOCYTES: 1.4 CELLS/MM3
ABS NEUTROPHILS: 2 CELLS/MM3
BASOPHILS NFR BLD AUTO: 0.4 %
EOSINOPHIL NFR BLD AUTO: 0 %
ERYTHROCYTE [DISTWIDTH] IN BLOOD BY AUTOMATED COUNT: 14.1 %
HCT VFR BLD AUTO: 35.3 %
HEMOGLOBIN: 11.7 G/DL
LYMPHOCYTES NFR BLD AUTO: 36.6 %
MCH RBC QN AUTO: 31.3 PG
MCHC RBC AUTO-ENTMCNC: 33.1 G/DL
MCV RBC AUTO: 94.7 FL
MONOCYTES # BLD AUTO: 0.4 10(3)/UL
MONOCYTES NFR BLD AUTO: 10.9 %
NEUTROPHILS NFR BLD AUTO: 52.1 %
PLATELETS: 303 X10E3/UL
PMV BLD: 6.5 FL
RBC # BLD AUTO: 3.73 X10E6/UL
WBC # BLD AUTO: 3.8 10^9/L

## 2024-03-22 NOTE — TELEPHONE ENCOUNTER
For clozapine monitoring purposes:   Current dose: 100mg BID  Laboratory Monitoring: Monthly    -Clozapine REMS guidelines recommend monitoring ANC only.    -Regular monitoring can continue until ANC <1.5   -Per these guidelines ANC is 2.0  -Will notify Dr. Leslie FRENCH updated

## 2024-03-22 NOTE — TELEPHONE ENCOUNTER
Writer received results of CBC w/Diff/Platelet from [employer]. Writer entered results into patients EMR. Writer routed to Psych Nurse Pool, sent a copy to scanning and a copy was held with writer until scanning complete.

## 2024-04-12 ENCOUNTER — MEDICAL CORRESPONDENCE (OUTPATIENT)
Dept: HEALTH INFORMATION MANAGEMENT | Facility: CLINIC | Age: 44
End: 2024-04-12
Payer: MEDICARE

## 2024-04-15 ENCOUNTER — TELEPHONE (OUTPATIENT)
Dept: PSYCHIATRY | Facility: CLINIC | Age: 44
End: 2024-04-15
Payer: MEDICARE

## 2024-04-15 NOTE — TELEPHONE ENCOUNTER
Received letter from pt's legal guardian and mother, [place], about pt's recent symptoms. Sent the letter to scanning, made a copy and put in current provider's folder, Dr Nelson. 04/15/2024.    - Chapin Caputo, Visit Facilitator

## 2024-05-09 ENCOUNTER — VIRTUAL VISIT (OUTPATIENT)
Dept: PSYCHIATRY | Facility: CLINIC | Age: 44
End: 2024-05-09
Attending: STUDENT IN AN ORGANIZED HEALTH CARE EDUCATION/TRAINING PROGRAM
Payer: MEDICARE

## 2024-05-09 DIAGNOSIS — Z79.899 ENCOUNTER FOR LONG-TERM (CURRENT) USE OF MEDICATIONS: Primary | ICD-10-CM

## 2024-05-09 DIAGNOSIS — F25.0 SCHIZOAFFECTIVE DISORDER, BIPOLAR TYPE (H): ICD-10-CM

## 2024-05-09 PROCEDURE — 99214 OFFICE O/P EST MOD 30 MIN: CPT | Mod: 95

## 2024-05-09 RX ORDER — CLOZAPINE 100 MG/1
TABLET ORAL
Qty: 90 TABLET | Refills: 1 | Status: SHIPPED | OUTPATIENT
Start: 2024-05-09 | End: 2024-06-27

## 2024-05-09 ASSESSMENT — PAIN SCALES - GENERAL: PAINLEVEL: NO PAIN (0)

## 2024-05-09 NOTE — NURSING NOTE
Is the patient currently in the state of [place]? YES    Visit mode:VIDEO    If the visit is dropped, the patient can be reconnected by: VIDEO VISIT: Send to e-mail at:   [email or web address]   Will anyone else be joining the visit? Sepideh from  and Mom  (If patient encounters technical issues they should call [phone]    How would you like to obtain your AVS? MyChart    Are changes needed to the allergy or medication list? Gabriela is not sure of her medications, wnts to discuss with provider    Are refills needed on medications prescribed by this physician? Wants to discuss meds before requesting refills    Reason for visit: ESTHER CLINEF

## 2024-05-09 NOTE — PATIENT INSTRUCTIONS
**For crisis resources, please see the information at the end of this document**   Patient Education    Thank you for coming to the [facility] MENTAL HEALTH & ADDICTION [place] CLIN[facility].     Lab Testing:  If you had lab testing today and your results are reassuring or normal they will be mailed to you or sent through [employer] within 7 days. If the lab tests need quick action we will call you with the results. The phone number we will call with results is # [phone]. If this is not the best number please call our clinic and change the number.     Medication Refills:  If you need any refills please call your pharmacy and they will contact us. Our fax number for refills is [phone].   Three business days of notice are needed for general medication refill requests.   Five business days of notice are needed for controlled substance refill requests.   If you need to change to a different pharmacy, please contact the new pharmacy directly. The new pharmacy will help you get your medications transferred.     Contact Us:  Please call [phone] during business hours (8-5:00 M-F).   If you have medication related questions after clinic hours, or on the weekend, please call [phone].     Financial Assistance [phone]   Medical Records [phone]       MENTAL HEALTH CRISIS RESOURCES:  For a emergency help, please call 911 or go to the nearest Emergency Department.     Emergency Walk-In Options:   EmPATH Unit @ [facility] ([place]): [phone] - Specialized mental health emergency area designed to be calming  [facility] ([place]): [phone]  [facility] Psychiat[facility] ([place]): [phone]  [facility]): [phone]    [facility] Crisis Information:   [place]: [phone]  [place]: [phone]  Jana (NASIMA) - Adult: [phone]     Child: [phone]  Jarod - Adult: [phone]     Child: [phone]  [place]:  [phone]  List of all [facility] resources:   https://[place].gov/dhs/people-we-serve/adults/health-care/mental-health/resources/crisis-contacts.jsp    National Crisis Information:   Crisis Text Line: Text  [place]  to 682553  Suicide & Crisis Lifeline: 988  National Suicide Prevention Lifeline: 5-575-991-TALK (1-234.711.1381)       For online chat options, visit https://suicidepreventionlifeline.org/chat/  Poison Control Center: 1-409-982-9573  Trans Lifeline: 3-015-528-6011 - Hotline for transgender people of all ages  The Mino Project: 1-850-263-2641 - Hotline for LGBT youth     For Non-Emergency Support:   Fast Tracker: Mental Health & Substance Use Disorder Resources -   https://www.[employer].org/

## 2024-05-10 ENCOUNTER — TELEPHONE (OUTPATIENT)
Dept: PSYCHIATRY | Facility: CLINIC | Age: 44
End: 2024-05-10
Payer: MEDICARE

## 2024-05-10 NOTE — TELEPHONE ENCOUNTER
Writer faxed labs then returned call to patient's care giver and confirmed labs were faxed to [facility].

## 2024-05-10 NOTE — TELEPHONE ENCOUNTER
[facility] Call Center    Phone Message    May a detailed message be left on voicemail: yes     Reason for Call: Other: pt mom called and stated that Dr. Nelson was going to put in some blood draws for the pt in [facility] but the lab has not received any orders from the Dr. Mom would like a nurse to give her a call      Action Taken: Other: nurse pool    Travel Screening: Not Applicable

## 2024-05-10 NOTE — TELEPHONE ENCOUNTER
Patient's mom calling clinic back, [facility] says they still have not received fax. Mom confirmed general fax number as [phone] and would like clinic to resend. Mom states she will follow-up with [facility] and [facility].

## 2024-05-13 NOTE — TELEPHONE ENCOUNTER
PT's caregiver called this morning after speaking to lab about blood draw order. Lab still says they do not have the order. Caregiver offered -- [phone] as newest number to send orders to.

## 2024-05-16 ENCOUNTER — TELEPHONE (OUTPATIENT)
Dept: PSYCHIATRY | Facility: CLINIC | Age: 44
End: 2024-05-16
Payer: MEDICARE

## 2024-05-16 NOTE — TELEPHONE ENCOUNTER
"Ashly Nelson MD P Psychiatry Nurse-[facility],     We will call this patient in a week to make sure they are started higher dose of clozapine and completed the collateral that is requested.     Thank you.     All the best,   Ashly     Follow Up:  Writer attempted to call number on file for patient and recording kept repeating \"please enter your mailbox number.\"   "

## 2024-05-17 NOTE — TELEPHONE ENCOUNTER
Writer called patient's guardian and she states that everything is going well with the increase in clozapine. She denies any  side effects or questions/concerns at this time.

## 2024-05-22 ENCOUNTER — VIRTUAL VISIT (OUTPATIENT)
Dept: PSYCHIATRY | Facility: CLINIC | Age: 44
End: 2024-05-22
Attending: STUDENT IN AN ORGANIZED HEALTH CARE EDUCATION/TRAINING PROGRAM
Payer: MEDICARE

## 2024-05-22 DIAGNOSIS — F25.0 SCHIZOAFFECTIVE DISORDER, BIPOLAR TYPE (H): Primary | ICD-10-CM

## 2024-05-22 PROCEDURE — 99214 OFFICE O/P EST MOD 30 MIN: CPT | Mod: 95

## 2024-05-22 ASSESSMENT — PAIN SCALES - GENERAL: PAINLEVEL: NO PAIN (0)

## 2024-05-22 NOTE — PATIENT INSTRUCTIONS
**For crisis resources, please see the information at the end of this document**   Patient Education    Thank you for coming to the [facility] MENTAL HEALTH & ADDICTION [place] CL[facility].     Lab Testing:  If you had lab testing today and your results are reassuring or normal they will be mailed to you or sent through [employer] within 7 days. If the lab tests need quick action we will call you with the results. The phone number we will call with results is # [phone]. If this is not the best number please call our clinic and change the number.     Medication Refills:  If you need any refills please call your pharmacy and they will contact us. Our fax number for refills is [phone].   Three business days of notice are needed for general medication refill requests.   Five business days of notice are needed for controlled substance refill requests.   If you need to change to a different pharmacy, please contact the new pharmacy directly. The new pharmacy will help you get your medications transferred.     Contact Us:  Please call [phone] during business hours (8-5:00 M-F).   If you have medication related questions after clinic hours, or on the weekend, please call [phone].     Financial Assistance [phone]   Medical Records [phone]       MENTAL HEALTH CRISIS RESOURCES:  For a emergency help, please call 911 or go to the nearest Emergency Department.     Emergency Walk-In Options:   EmPATH Unit @ [facility] ([place]): [phone] - Specialized mental health emergency area designed to be calming  [facility] ([place]): [phone]  [facility] Acute Psychiatry Services ([place]): [phone]  [facility]): [phone]    [facility] Crisis Information:   [place]: [phone]  [place]: [phone]  [place] (NASIMA) - Adult: [phone]     Child: [phone]  Jarod - Adult: [phone]     Child: [phone]  [place]:  [phone]  List of all [facility] resources:   https://[place].gov/dhs/people-we-serve/adults/health-care/mental-health/resources/crisis-contacts.jsp    National Crisis Information:   Crisis Text Line: Text  [place]  to 594420  Suicide & Crisis Lifeline: 988  National Suicide Prevention Lifeline: 3-630-335-TALK (1-735.641.2585)       For online chat options, visit https://suicidepreventionlifeline.org/chat/  Poison Control Center: 3-362-992-0334  Trans Lifeline: 2-363-483-9041 - Hotline for transgender people of all ages  The Mino Project: 4-395-939-0265 - Hotline for LGBT youth     For Non-Emergency Support:   Fast Tracker: Mental Health & Substance Use Disorder Resources -   https://www.[employer].org/

## 2024-05-22 NOTE — NURSING NOTE
Is the patient currently in the state of [place]? YES    Visit mode:VIDEO    If the visit is dropped, the patient can be reconnected by: VIDEO VISIT: Send to e-mail at: [email or web address]     Will anyone else be joining the visit? YES: How would they like to receive their invitation? Text to cell phone: [phone]  (If patient encounters technical issues they should call [phone])    How would you like to obtain your AVS? Not needed    Are changes needed to the allergy or medication list? No    Are refills needed on medications prescribed by this physician? NO    Reason for visit: ESTHER RUSHING

## 2024-05-22 NOTE — PROGRESS NOTES
Virtual Visit Details    Type of service:  Video Visit   Video Start Time:  3:10  Video End Time: 3:40    Originating Location (pt. Location): Assisted Living    Distant Location (provider location):  On-site  Platform used for Video Visit: Vivek

## 2024-06-27 ENCOUNTER — TELEPHONE (OUTPATIENT)
Dept: PSYCHIATRY | Facility: CLINIC | Age: 44
End: 2024-06-27
Payer: MEDICARE

## 2024-06-27 DIAGNOSIS — F25.0 SCHIZOAFFECTIVE DISORDER, BIPOLAR TYPE (H): ICD-10-CM

## 2024-06-27 RX ORDER — CLOZAPINE 100 MG/1
TABLET ORAL
Qty: 90 TABLET | Refills: 1 | Status: SHIPPED | OUTPATIENT
Start: 2024-06-27 | End: 2024-08-14

## 2024-06-27 NOTE — TELEPHONE ENCOUNTER
Last seen: 2024  RTC: 8 weeks with the new resident.  Cancel: None  No-show: None  Next appt: None     Incoming refill from Caregiver via phone    Medication requested:   Pending Prescriptions:                       Disp   Refills    cloZAPine (CLOZARIL) 100 MG tablet        90 tab*1            Si tablet (100 mg) every morning AND 2 tablets           (200 mg) at bedtime.    From chart note:   - Continue clozapine 100 mg QAM and 200 mg at bedtime      Medication refill approved per refill protocol.     Medication unable to be refilled by RN due to criteria not met as indicated.                 []Eligibility - not seen in the last year              []Supervision - no future appointment              []Compliance - no shows, cancellations or lapse in therapy              []Verification - order discrepancy              []Controlled medication              []Medication not included in policy              []90-day supply request              [x]Other:

## 2024-06-27 NOTE — TELEPHONE ENCOUNTER
"Writer received a fax from [facility] stating   \" Clozapine REMS has rejected the refill dated today 6/27- Rejection says \"do not fill\"- Gabriela Juarez, patient, does not have a current Patient Status Form (PSF) on file- Please get form to Clozapine REMS ASAP.\"        Writer routed to Psych Nurse Pool.     "

## 2024-06-27 NOTE — TELEPHONE ENCOUNTER
Upon chart review, patient has not had CBC drawn since 5/3.    Writer called preferred number and spoke with patient's caregiver. They confirm that patient has been consistently taking clozapine, but will run out tomorrow. Caregiver states that they tried to make an appointment for patient's labs, but they did not have the order on file.     Writer faxed standing lab order for CBC w/ diff to [facility] per caregiver request. She will call the clinic back if there are any issues getting labs drawn.     [facility]:    Fax: [phone]  Phone: [phone]

## 2024-06-27 NOTE — TELEPHONE ENCOUNTER
[facility] Call Center    Phone Message    May a detailed message be left on voicemail: yes     Reason for Call: Medication Refill Request    Has the patient contacted the pharmacy for the refill? Yes   Name of medication being requested: cloZAPine  Provider who prescribed the medication: Dr. Ashly Nelson MD  Pharmacy: [facility]  Date medication is needed: pt has 2 days of medication left      Caregiver stated that pt will have their labs drawn tomorrow morning.    Action Taken: Other: [facility]    Travel Screening: Not Applicable     Date of Service:

## 2024-06-28 NOTE — TELEPHONE ENCOUNTER
Writer spoke with provider, Dr. Nelson who authorized clozapine refill prior to obtaining lab results this month. Patient has labs scheduled for this morning.

## 2024-06-28 NOTE — TELEPHONE ENCOUNTER
Writer called pharmacy to authorize clozapine refill without lab results. Pharmacy advised that their system will not let them dispense the medication without an updated PSF form. Provider will need to go into the REM[facility] and complete this for patient.     Pharmacist states they are open until 5:30 today and open tomorrow morning as well.

## 2024-06-28 NOTE — TELEPHONE ENCOUNTER
REMS updated with lab results. Writer called pharmacy and they confirmed they can now dispense medication. They will reach out to patient/caregiver to advise of refill.

## 2024-07-18 DIAGNOSIS — R56.9 SEIZURES (H): ICD-10-CM

## 2024-07-18 RX ORDER — LEVETIRACETAM 750 MG/1
750 TABLET ORAL EVERY MORNING
Qty: 90 TABLET | Refills: 3 | Status: SHIPPED | OUTPATIENT
Start: 2024-07-18

## 2024-07-18 NOTE — TELEPHONE ENCOUNTER
Pending Prescriptions:                       Disp   Refills    levETIRAcetam (KEPPRA) 750 MG tablet      90 tab*3            Sig: Take 1 tablet (750 mg) by mouth every morning       Requested Pharmacy: [facility] [place], [place] [facility] [address]      Pt's last office visit: 02/21/2024  Next scheduled office visit: 02/19/2025      Per the RN/LPN medication refill protocol, writer is unable to refill this request.

## 2024-07-25 ENCOUNTER — TELEPHONE (OUTPATIENT)
Dept: PSYCHIATRY | Facility: CLINIC | Age: 44
End: 2024-07-25
Payer: MEDICARE

## 2024-07-25 DIAGNOSIS — F25.0 SCHIZOAFFECTIVE DISORDER, BIPOLAR TYPE (H): ICD-10-CM

## 2024-07-25 NOTE — TELEPHONE ENCOUNTER
Upon chart review, it appears that patient should have a remaining refill available at requested pharmacy. Writer called pharmacy and they confirmed they do have the prescription on the shelf. Writer also faxed active lab order [facility] [phone].    ANC on 6/28/2024: 1.9     Last seen: 05/22/2024  RTC: 8 weeks with the new resident.  Cancel: None  No-show: None  Next appt: 08/06/2024     Incoming refill from Caregiver via phone    Medication requested:   Pending Prescriptions:                       Disp   Refills    cloZAPine (CLOZARIL) 100 MG tablet        60 tab*2            Sig: Take 1 tablet (100 mg) by mouth 2 times daily    cloZAPine (CLOZARIL) 100 MG tablet        90 tab*1            Sig: Take 1 tablet (100 mg) by mouth every morning AND           2 tablets (200 mg) at bedtime.      From chart note:   - Continue clozapine 100 mg QAM and 200 mg at bedtime       Medication unable to be refilled by RN due to criteria not met as indicated.                 []Eligibility - not seen in the last year              []Supervision - no future appointment              []Compliance - no shows, cancellations or lapse in therapy              []Verification - order discrepancy              []Controlled medication              []Medication not included in policy              []90-day supply request              [x]Other:

## 2024-07-25 NOTE — TELEPHONE ENCOUNTER
[facility] Call Center    Phone Message    May a detailed message be left on voicemail: yes     Reason for Call: Medication Refill Request    Has the patient contacted the pharmacy for the refill? Yes     Name of medication being requested: Clozaril 100mg     Provider who prescribed the medication: Ashly Nelson     Pharmacy: [address] [place] [id number]     Date medication is needed: Asap (Pt's caregiver also stated that they need a lab order put in for the patient) Care giver stated that patient gets lab drawn every month.     Action Taken: Other: [facility]     Travel Screening: Not Applicable

## 2024-08-06 ENCOUNTER — VIRTUAL VISIT (OUTPATIENT)
Dept: PSYCHIATRY | Facility: CLINIC | Age: 44
End: 2024-08-06
Attending: STUDENT IN AN ORGANIZED HEALTH CARE EDUCATION/TRAINING PROGRAM
Payer: MEDICARE

## 2024-08-06 DIAGNOSIS — F84.9 PERVASIVE DEVELOPMENTAL DISORDER: ICD-10-CM

## 2024-08-06 DIAGNOSIS — F25.0 SCHIZOAFFECTIVE DISORDER, BIPOLAR TYPE (H): Primary | ICD-10-CM

## 2024-08-06 DIAGNOSIS — K59.03 DRUG-INDUCED CONSTIPATION: ICD-10-CM

## 2024-08-06 PROCEDURE — 90792 PSYCH DIAG EVAL W/MED SRVCS: CPT | Mod: 95

## 2024-08-06 RX ORDER — ALBUTEROL SULFATE 90 UG/1
1-2 AEROSOL, METERED RESPIRATORY (INHALATION)
COMMUNITY
Start: 2024-02-28

## 2024-08-06 ASSESSMENT — PAIN SCALES - GENERAL: PAINLEVEL: EXTREME PAIN (8)

## 2024-08-06 NOTE — PROGRESS NOTES
Virtual Visit Details    Type of service:  Video Visit   Video Start Time: 10:12 AM  Video End Time: 10:56 AM    Originating Location (pt. Location): Long term Care  Distant Location (provider location):  On-site  Platform used for Video Visit: [facility]  Brief Care Team  TRANSFER of CARE DIAGNOSTIC ASSESSMENT       CARE TEAM:    PCP- Funmilayo ray  Therapist- None  Neuro- Dr. Seals    Gabriela is a 43 year old who uses the pronouns she, her, hers.                 Chief Concern    Transfer of care and medication management                Assessment & Plan     Gabriela is seen in this clinic for follow-up and bridging of care for clozapine management following the nursing home of her long-term clozapine provider, Dr. Spaulding.      - Schizoaffective disorder, bipolar type: Gabriela is back to her baseline functioning and behavior. We decided not to increase the clozapine dose to observe the current progress in improving symptoms, but there is still room for increasing the dose if needed.      - Pervasive developmental delay/microcephaly/Seizure disorder: Per caregiver's report, has not ever had genetic screening done in the past. Given this potential decline, known developmental delay, and potentially abrupt onset of full catatonic symptoms at age 14 do raise some concern for some type of genetic syndrome. Could benefit from collaboration between neurology, PCP, and potentially a genetics specialist for a more nuanced evaluation of condition and potential progression.      - Constipation 2/2 clozapine: Constipation notably improved with addition of prune juice and now having bowel movements regularly. No changes to constipation management plan at this time.       Risk Statements:   Treatment Risk- Risks, benefits, alternatives and potential adverse effects have been discussed and are understood.   Safety Risk-Gabriela did not  appear to be an imminent safety risk to self or others.      PLAN    1) Medications:   - Continue clozapine 100 mg QAM and 200 mg at bedtime   - Continue Miralax to 1-2 capfuls daily   - Continue Dulcolax 10mg suppository PRN if no bowel movements for 3 days  - Continue daily glass of prune juice for constipation  Clozapine refilled for 60 days should last until ~10/25/2024    2) Psychotherapy: None, well-supported by staff at [facility]     3) Next due:  Labs- Continue monthly ANC monitoring for clozapine; antipsychotic monitoring labs below, next labs due in 2025  Hemoglobin A1c, thyroid function, complete metabolic profile and 12-hour trough clozapine level is updated and WNL other than clozapine level which was low.  There are CBC standing orders until Jan 2025 & clozapine level orders until May 2025  EKG- Routine monitoring is not indicated for current psychotropic medication regimen   Rating scales- AIMS: Score of 1 for minimal LE movement on 10/11/2023 --> next due on 10/2024     4) Referrals:  Team will start looking to identify outside long-term providers to manage ongoing clozapine (from 5/22 note)     5) Other: none: may consider whole exome testing for genetic variants associated with neuro developmental delay, seizures, ASD psychosis or catatonia in the future if noting deterioration in function     6) Follow-up: Return to clinic in 8-12 weeks                   Pertinent Background    Early history: Adopted at age 6 months with known microcephaly. Family elected to not have surgical intervention done for this due to associated risks. Attended school and enjoyed it. Known diagnosis of pervasive developmental delay with corresponding additional supports. Marked regression in function around age 14 with onset of catatonia and schizophrenia spectrum disorder symptoms. High level of support from family.  Legal: Adoptive mother, Slime, is legal guardian    Gabriela has a known medical history of developmental  "delay and microcephaly; she has not had previous genetic workup and minimal family is known due to being adopted. Gabriela first experienced symptoms of ADHD and was treated for this in 2nd grade. At age 14, family noted prodromal symptoms of hyper-fixation on specific movies, TV shows, and other activities. At age 14 while at a [employer] became unresponsive and \"catatonic\" prompting emergent transportation to the hospital and prolonged 2 month hospitalization. Multiple trials of medications during this period with minimal efficacy. Eventually given a trial of clozapine with marked improvement in symptoms. Of note, after this period retained little to no memory of life events prior to age 7, with the exception of some sign language she had learned earlier. Additionally, during this period she lost the ability to perform basic activities she had been able to do previously, including tying her shoes, cooking, and other activities. ECT not used to treat that catatonic episode. More recently there had been concerns for cognitive declines and possible concerns for dementia, but an increase in Keppra may also have been a factor. Has not ever had genetic workup for microcephaly, seizure disorder, and psychiatric concerns. Previously on high doses of clozapine augmented with lithium. Lithium tapered and discontinued in the setting of Cr elevation with no notable destabilization with discontinuation. Clozapine had also been reduced previously due to elevated drug levels on monitoring.     Pertinent items include: psychosis , all , mutiple psychotropic trials , and major medical problems                 History of Present Illness     Both Sepideh (Gabriela's caregiver) and our clinic rooming staff tried contacting Slime (Gabriela's mom) to join the visit, but there were unable to reach her.  The visit took place virtually with Sepideh Ochoa and this provider.   Gabriela says she is doing \"good\" and rates her mood 10/10 with 10 being " "\"great.\" Gabriela talked about enjoying Movie nights in her long-term care facility, and also enjoying to work on different activities such as cleaning and down laundry. She commented that she is learning to do pottery and talked about her creations. Regarding anxiety, sleep and appetite, Gabriela states the are all \"good.\" I asked Gabriela and Sepideh if the previous resident doctor had talked to them about the Brief Care Team, and neither one seemed to know of it. I briefly explained the purpose of BCT and asked Sepideh to let Slime know so we can discuss this.     Addendum 8/13:  Slime contacted our office, as she was under the impression that her daughter was being transferred to another physician. She apologized for not being at the visit, as she had a medical emergency with her son. Was explained that the BCT could help patient transition her psychiatric care to her PCP or a community provider. Slime replied that she would prefer that Gabriela stays in the clinic.    Current Social History:  Financial/occupational: SSDI, family support  Living situation (partner, children, pets, etc): Lives with 3 roommates and 2 staff members at [facility] since age 20  Social/spiritual support: Family, [facility] staff  Feels safe at home: Yes     Pertinent Substance Use:   Alcohol: No   Cannabis: No  Tobacco: No  Caffeine:  Yes - coffee daily   Opioids: No   Narcan Kit current: N/A  Other substances: none     A comprehensive review of systems was performed and is negative other than noted above.                Physical Exam  (Vitals Only)    There were no vitals taken for this visit.  Pulse Readings from Last 3 Encounters:   11/22/23 98   10/11/23 105   04/24/19 95     Wt Readings from Last 3 Encounters:   11/22/23 63.4 kg (139 lb 12.8 oz)   10/11/23 63.2 kg (139 lb 6.4 oz)   04/24/19 70.4 kg (155 lb 4.8 oz)     BP Readings from Last 3 Encounters:   11/22/23 123/80   10/11/23 125/80   04/24/19 141/85                  Mental Status " "Exam    Alertness: alert and slow to respond at times  Appearance: adequately groomed - microcephalic  Behavior/Demeanor: cooperative, pleasant, and calm, with  intermittent eye contact   Speech: increased latency of response and followed by period of more rapid speech  Language: intact  Psychomotor:  Intermittent movements of feet, appear unnoticed by patient  Mood: description consistent with euthymia  Affect: restricted but followed with more reactive affect when actively engaged in the conversation; congruent to: mood- yes, content- yes  Thought Process/Associations: unremarkable  Thought Content:  Reports none;  Denies suicidal & violent ideation and delusions  Perception:  Reports none;  Denies hallucinations  Insight: adequate with support from family, limited by known developmental delays  Judgment: adequate for safety  Cognition:  Known pervasive developmental delay  Gait and Station:  Ambulates with support of rollerator for stability (during this virtual visit she remained seated)               Family History     Unknown - adopted                 Past Psychiatric History  - Psychotropic Medication Trials   Medication Max Dose (mg) Dates / Duration Helpful? DC Reason / Adverse Effects?   clozapine 600 Age 14 - current Y Dose slowly scaled back due to high serum levels   lithium 300 Until 06/2023 Y Stopped due to elevated Cr on labs      Mother reports \"MANY different medications\" at time of first episode of psychosis before eventually settling on clozapine, which she has been on since that time.                Past Medical History     Patient Active Problem List   Diagnosis    Seizures (H)                 Allergies     Other environmental allergy                Medications     Current Outpatient Medications   Medication Sig Dispense Refill    albuterol (PROAIR HFA/PROVENTIL HFA/VENTOLIN HFA) 108 (90 Base) MCG/ACT inhaler Inhale 1-2 puffs into the lungs      calcium carbonate (OS-SHARMIN 500 MG [place]) 500 MG " tablet Take 500 mg by mouth daily      cloZAPine (CLOZARIL) 100 MG tablet Take 1 tablet (100 mg) by mouth 2 times daily 60 tablet 2    ferrous sulfate (FEROSUL) 325 (65 Fe) MG tablet Take 325 mg by mouth daily (with breakfast)      Fluticasone Propionate, Inhal, (FLOVENT IN) Inhale 1 puff into the lungs 2 times daily      levETIRAcetam (KEPPRA) 1000 MG tablet Take 1 tablet (1,000 mg) by mouth every evening 90 tablet 3    levETIRAcetam (KEPPRA) 750 MG tablet Take 1 tablet (750 mg) by mouth every morning 90 tablet 3    magnesium oxide 400 MG CAPS Take 1 capsule by mouth daily      pantoprazole (PROTONIX) 40 MG EC tablet Take 1 tablet by mouth daily before breakfast      polyethylene glycol (MIRALAX) 17 GM/Dose powder Take 17-34 g (1-2 Capfuls) by mouth daily 850 g 2    bisacodyl (DULCOLAX) 10 MG suppository Place 1 suppository (10 mg) rectally daily as needed for constipation - to be used if 3 days without a bowel movement (Patient not taking: Reported on 8/6/2024) 30 suppository 2    cloZAPine (CLOZARIL) 100 MG tablet Take 1 tablet (100 mg) by mouth every morning AND 2 tablets (200 mg) at bedtime. 90 tablet 1    Cyanocobalamin (B-12 PO) Take by mouth daily (Patient not taking: Reported on 8/6/2024)                   Data         10/11/2023     1:56 PM 5/9/2024    10:33 AM 5/22/2024     3:11 PM   PROMIS-10 Total Score w/o Sub Scores   PROMIS TOTAL - SUBSCORES 31 34 33         10/11/2023     1:56 PM   CAGE-AID Total Score   Total Score 0   Total Score MyChart 0 (A total score of 2 or greater is considered clinically significant)         10/11/2023     1:36 PM 12/20/2023     1:04 PM   PHQ-9 SCORE   PHQ-9 Total Score MyChart 0 3 (Minimal depression)   PHQ-9 Total Score 0 3           Recent Labs   Lab Test 05/14/24  0832   CLOZQT 258*     Recent Labs   Lab Test 05/14/24  0832   CLOZMT 502   combined therapeutic range: 450-500 ng/mL;  caution adverse effects 800-1200 ng/mL      ECG 5/3/24 done in [facility] QTc =  [facility]  Normal sinus rhythm      PROVIDER: Yajaira Lobo MD    Patient staffed in clinic with Dr. Bales  who will sign the note.  Supervisor is Dr. Zaldivar.      Please note that the documentation above encompasses information from multiple sources, including that of previous providers in our clinic. This documentation has been reviewed and updated by me as necessary, to ensure the continuity and safety of your care.

## 2024-08-06 NOTE — LETTER
August 19, 2024      Gabriela Juarez  [address] [place] [address]        Dear Gabriela,    Thank you for choosing [facility] for your care.  We have included a summary of your after-visit instructions.    If you have any questions or need help with scheduling, please contact the clinic from which you receive your primary care. If you have any additional questions, call us at [phone].    Yours in [facility],   [facility] Care Team

## 2024-08-06 NOTE — NURSING NOTE
Is the patient currently in the state of [place]? YES    Current patient location: [address] [place] [address] [address] [facility] [address]    Visit mode:VIDEO    If the visit is dropped, the patient can be reconnected by:  [email or web address]    Will anyone else be joining the visit? Yes: How would they like to receive their invite Text to cell phone: [phone]  (If patient encounters technical issues they should call [phone])    How would you like to obtain your AVS? Mail a copy    Are changes needed to the allergy or medication list? No    Are refills needed on medications prescribed by this physician? NO    Rooming Documentation: Assigned questionnaire(s) completed .    Reason for visit: RECHECK     СЕРГЕЙ Cook

## 2024-08-13 ENCOUNTER — TELEPHONE (OUTPATIENT)
Dept: PSYCHIATRY | Facility: CLINIC | Age: 44
End: 2024-08-13
Payer: MEDICARE

## 2024-08-13 NOTE — TELEPHONE ENCOUNTER
[facility] Call Center    Phone Message    May a detailed message be left on voicemail: yes     Reason for Call: Other: Pt's mother, legal guardian, called because guardian was not able to attend pt's last visit with Dr. Lobo. Slime is requesting a call back to discuss whether pt is being transferred to a new provider closer to pt's home, as well as to get any updates from the visit.      Action Taken: Other: [facility]    Travel Screening: Not Applicable     Date of Service:

## 2024-08-14 RX ORDER — CLOZAPINE 100 MG/1
TABLET ORAL
Qty: 90 TABLET | Refills: 1 | Status: SHIPPED | OUTPATIENT
Start: 2024-08-14

## 2024-08-14 NOTE — PATIENT INSTRUCTIONS
Gabriela,    It was really nice meeting you!     Below is the Treatment Plan summary from today's visit:       Medications  - Your clozapine was refilled and you should have enough medication until Oct 2024    If you would like to continue being seen at this clinic, I can see you back in 12 weeks or sooner if needed.    If at any point, you and your PCP decide that they can continue managing your mental health care medications, please let us know. If you'd rather find a community mental health care provider, let us know to help you in this process. You may call our clinic and let the support staff know that you are one of the Brief Care team (BCT) patients and would like assistance for your follow-up psychiatric care.     Crisis numbers are below and clinic after hours number is [phone]       **For crisis resources, please see the information at the end of this document**   Patient Education    Thank you for coming to the [facility] [place] [facility].     Lab Testing:  If you had lab testing today and your results are reassuring or normal they will be mailed to you or sent through [facility] within 7 days. If the lab tests need quick action we will call you with the results. The phone number we will call with results is # [phone]. If this is not the best number please call our clinic and change the number.     Medication Refills:  If you need any refills please call your pharmacy and they will contact us. Our fax number for refills is [phone].   Three business days of notice are needed for general medication refill requests.   Five business days of notice are needed for controlled substance refill requests.   If you need to change to a different pharmacy, please contact the new pharmacy directly. The new pharmacy will help you get your medications transferred.     Contact Us:  Please call [phone] during business hours (8-5:00 M-F).   If you have medication related  questions after clinic hours, or on the weekend, please call [phone].     Financial Assistance [phone]   Medical Records [phone]       MENTAL HEALTH CRISIS RESOURCES:  For a emergency help, please call 911 or go to the nearest Emergency Department.     Emergency Walk-In Options:   EmPATH Unit @ [facility] ([place]): [phone] - Specialized mental health emergency area designed to be calming  [facility] ([place]): [phone]  [facility] [place] [facility] ([place]): [phone]  [facility] ([place]): [phone]    [facility] Crisis Information:   [place]: [phone]  [place]: [phone]  [place] ([place]) - Adult: [phone]     Child: [phone]  [place] - Adult: [phone]     Child: [phone]  [place]: [phone]  List of all [facility] resources:   https://[place].[facility]/dhs/people-we-serve/adults/health-care/mental-health/resources/crisis-contacts.jsp    National Crisis Information:   Crisis Text Line: Text  [place]  to 556837  Suicide & Crisis Lifeline: 988  National Suicide Prevention Lifeline: 5-984-040-TALK (1-925.503.3879)       For online chat options, visit https://suicidepreventionlifeline.org/chat/  Poison Control Center: 1-707.412.8522  Trans Lifeline: 1-177.368.1955 - Hotline for transgender people of all ages  The [employer] Project: 2-558-735-2223 - Hotline for LGBT youth     For Non-Emergency Support:   Fast Tracker: Mental Health & Substance Use Disorder Resources -   https://www.[facility].org/

## 2024-08-14 NOTE — TELEPHONE ENCOUNTER
"It is a charo name, so I don't mind! Thanks Yaquelin.    This patient was identified as part of the Brief Care team, as such, I will let  (cc'ed here) decide on upcoming visits.    FYI to Mallika (even though she is with me in training), to have Gabriela on your radar. Briefly: I had a visit with her on  and her mother/guardian could not attend. Mom called to get an update on the visit and to \"discuss whether pt is being transferred to a new provider closer to pt's home.\" The idea of trf may have come from my spiel on BCT. No changes done during the visit - all continues the same as her last appt on . Should have clozapine at least until 10/25/24, with  CBC standing order good until 2025, and clozapine level set to  in 2025. Yaquelin called to clarify and it seems like she wants to stay in clinic either w/ a resident or NP.  Thanks.  " BMI:   HbA1c: A1C with Estimated Average Glucose Result: 5.3 % (01-26-22 @ 10:04)    Glucose:   BP: 120/72 (01-26-22 @ 00:23) (112/87 - 138/99)  Lipid Panel:  BMI:   HbA1c: A1C with Estimated Average Glucose Result: 5.3 % (01-26-22 @ 10:04)    Glucose:   BP: 120/72 (01-26-22 @ 00:23) (112/87 - 138/99)  Lipid Panel: Date/Time: 01-26-22 @ 10:04  Cholesterol, Serum: 179  Direct LDL: --  HDL Cholesterol, Serum: 82  Total Cholesterol/HDL Ration Measurement: --  Triglycerides, Serum: 62

## 2024-08-14 NOTE — TELEPHONE ENCOUNTER
Natanael Jamison, I forgot that I will be in training on Wed and Thursday. I'm adding , whom I staffed with the day of her visit, in case he is able to speak to Gabriela's mom.    There are no updates regarding her visit with me. Continue with the same treatment plan when she last had an appt on . I sent a refill for clozapine (she should have enough until~24 from a previous script) and with my 60-day refill, patient should have enough tablets until ~ 10/25/24). I see a CBC standing order good until 2025, and I believe labs for clozapine level is set to  in 2025. We just need to clarify whether she would like to continue being seen in clinic vs. Community provider.  Thanks.     I briefly mentioned the Brief Care Team, and Sepideh (who was assisting the pt.at her group home) took note of it, and told she would tell Slime.

## 2024-10-08 ENCOUNTER — VIRTUAL VISIT (OUTPATIENT)
Dept: PSYCHIATRY | Facility: CLINIC | Age: 44
End: 2024-10-08
Attending: PSYCHIATRY & NEUROLOGY
Payer: MEDICARE

## 2024-10-08 DIAGNOSIS — Z79.899 ENCOUNTER FOR LONG-TERM (CURRENT) USE OF MEDICATIONS: ICD-10-CM

## 2024-10-08 DIAGNOSIS — F84.9 PERVASIVE DEVELOPMENTAL DISORDER: ICD-10-CM

## 2024-10-08 DIAGNOSIS — F25.0 SCHIZOAFFECTIVE DISORDER, BIPOLAR TYPE (H): Primary | ICD-10-CM

## 2024-10-08 PROCEDURE — G2211 COMPLEX E/M VISIT ADD ON: HCPCS | Mod: 95 | Performed by: PSYCHIATRY & NEUROLOGY

## 2024-10-08 PROCEDURE — 99215 OFFICE O/P EST HI 40 MIN: CPT | Mod: 95 | Performed by: PSYCHIATRY & NEUROLOGY

## 2024-10-08 RX ORDER — ACETAMINOPHEN 500 MG
500-1000 TABLET ORAL EVERY 6 HOURS PRN
COMMUNITY
Start: 2024-02-28

## 2024-10-08 RX ORDER — CALCIUM CARBONATE/VITAMIN D3 600 MG-10
1 TABLET ORAL EVERY MORNING
COMMUNITY
Start: 2024-02-28

## 2024-10-08 RX ORDER — CLOZAPINE 100 MG/1
TABLET ORAL
Qty: 90 TABLET | Refills: 3 | Status: SHIPPED | OUTPATIENT
Start: 2024-10-08

## 2024-10-08 RX ORDER — FLUTICASONE FUROATE 50 UG/1
POWDER RESPIRATORY (INHALATION)
COMMUNITY
Start: 2024-09-23

## 2024-10-08 NOTE — PATIENT INSTRUCTIONS
**For crisis resources, please see the information at the end of this document**   Patient Education    Thank you for coming to the [facility] [place] [facility].     Lab Testing:  If you had lab testing today and your results are reassuring or normal they will be mailed to you or sent through phorus within 7 days. If the lab tests need quick action we will call you with the results. The phone number we will call with results is # [phone]. If this is not the best number please call our clinic and change the number.     Medication Refills:  If you need any refills please call your pharmacy and they will contact us. Our fax number for refills is [phone].   Three business days of notice are needed for general medication refill requests.   Five business days of notice are needed for controlled substance refill requests.   If you need to change to a different pharmacy, please contact the new pharmacy directly. The new pharmacy will help you get your medications transferred.     Contact Us:  Please call [phone] during business hours (8-5:00 M-F).   If you have medication related questions after clinic hours, or on the weekend, please call [phone].     Financial Assistance [phone]   Medical Records [phone]       MENTAL HEALTH CRISIS RESOURCES:  For a emergency help, please call 911 or go to the nearest Emergency Department.     Emergency Walk-In Options:   EmPATH Unit @ [facility] ([place]): [phone] - Specialized mental health emergency area designed to be calming  [facility] ([place]): [phone]  [facility] ([place]): [phone]  [facility]): [phone]    [facility] Crisis Information:   [place]: [phone]  [place]: [phone]  Jana (NASIMA) - Adult: [phone]     Child: [phone]  Jarod - Adult: [phone]     Child: [phone]  [place]:  [phone]  List of all [facility] resources:   https://[place].gov/dhs/people-we-serve/adults/health-care/mental-health/resources/crisis-contacts.jsp    National Crisis Information:   Crisis Text Line: Text  [place]  to 952736  Suicide & Crisis Lifeline: 988  National Suicide Prevention Lifeline: 5-242-308-TALK (1-398.309.3800)       For online chat options, visit https://suicidepreventionlifeline.org/chat/  Poison Control Center: 5-857-889-5680  Trans Lifeline: 1-522-402-3759 - Hotline for transgender people of all ages  The Mino Project: 0-892-505-7330 - Hotline for LGBT youth     For Non-Emergency Support:   Fast Tracker: Mental Health & Substance Use Disorder Resources -   https://www.EdlogicsckApprissn.org/

## 2024-10-08 NOTE — PROGRESS NOTES
Virtual Visit Details  Type of service:  Video Visit   Start Time: 1:37 PM  End Time: 1:45 PM  Originating Location (pt. Location): Long term Care  Distant Location (provider location):  On-site  Platform used for Video Visit: [facility]  Brief Care Team  MEDICAL PROGRESS NOTE       CARE TEAM:    PCP- Mary Soliman with [facility]  Therapist- None  Neuro- Dr. Seals    Gabriela is a 43 year old who uses the pronouns she, her, hers.   Mother Slime is legal guardian and routinely attends appointments.                 Assessment & Plan     Gabriela is seen in this clinic for follow-up and bridging of care for clozapine management following the [facility] of her long-term clozapine provider, Dr. Spaulding.      # Schizoaffective disorder, bipolar type: Gabriela is back to her baseline functioning and behavior. No adjustment needed in clozapine at this time, but there is room to increase the dose if needed.   - Continue clozapine 100mg QAM and 200mg at bedtime     Labs- *Must fax lab orders to [facility] - [phone]*  - Monthly CBC/ANC clozapine monitoring done via [facility]  - Annual SGA monitoring includes CMP, A1c, fasting lipids. Due: 02/2025  - May consider whole exome testing for genetic variants associated with neuro developmental delay, seizures, ASD, psychosis, or catatonia in the future if noting deterioration in function  EKG- Routine monitoring is not indicated for current psychotropic medication regimen   Rating scales- AIMS due: Currently     # Pervasive developmental delay/microcephaly/seizure disorder: Per hx, Gabriela has not had genetic screening done in the past. Given her medical and psychiatric history, including abrupt onset of catatonic symptoms at age 14, the suspicion for possible genetic syndrome is elevated. Having additional information on this wouldn't likely change the current course of care, but  could be beneficial in case of worsening symptoms in the future.      # Constipation 2/2 clozapine: Constipation improved with addition of prune juice and she is now having bowel movements regularly. No changes to constipation regimen at this time.  - Continue daily glass of prune juice for constipation  - Continue Miralax 1-2 capfuls daily   - Continue bisacodyl (Dulcolax) 10mg suppository as needed if no bowel movements for 3 days    Disposition: Return to clinic in 8-12 weeks. Brief Care Team is looking to identify outside long-term providers to manage ongoing clozapine.                    Interval History   Things have been going well lately. They note that Gabriela has been doing pretty well ever since the last dose adjustment last year. She has been feeling pretty happy, and has not had any significant symptoms.        Social History Update:  Financial/occupational: SSDI, family support  Living situation (partner, children, pets, etc): Lives with 3 roommates and 2 staff members at [employer] since age 20     Pertinent Substance Use:   Alcohol: No   Cannabis: No  Tobacco: No  Caffeine:  Yes - coffee daily                   Physical Exam  (Vitals Only)    There were no vitals taken for this visit.  Pulse Readings from Last 3 Encounters:   11/22/23 98   10/11/23 105   04/24/19 95     Wt Readings from Last 3 Encounters:   11/22/23 63.4 kg (139 lb 12.8 oz)   10/11/23 63.2 kg (139 lb 6.4 oz)   04/24/19 70.4 kg (155 lb 4.8 oz)     BP Readings from Last 3 Encounters:   11/22/23 123/80   10/11/23 125/80   04/24/19 141/85                  Mental Status Exam    Alertness: alert and slow to respond at times  Appearance: adequately groomed - microcephalic  Behavior/Demeanor: cooperative, pleasant, and calm, with  intermittent eye contact   Speech: increased latency of response and followed by period of more rapid speech  Language: intact  Psychomotor:  Intermittent movements of feet, appear unnoticed by patient  Mood:  "description consistent with euthymia  Affect: restricted but followed with more reactive affect when actively engaged in the conversation; congruent to: mood- yes, content- yes  Thought Process/Associations: unremarkable  Thought Content:  Reports none;  Denies suicidal & violent ideation and delusions  Perception:  Reports none;  Denies hallucinations  Insight: adequate with support from family, limited by known developmental delays  Judgment: adequate for safety  Cognition:  Known pervasive developmental delay  Gait and Station:  Ambulates with support of rollerator for stability (during this virtual visit she remained seated)                  Past Psychiatric History - Summary points of care since 07/2024    Past Psychotropic Medication Trials   Medication Max Dose (mg) Dates / Duration Helpful? DC Reason / Adverse Effects?   clozapine 600 Age 14 Y Dose slowly scaled back due to high serum levels   lithium 300 Until 06/2023 Y Stopped due to elevated Cr on labs     Mother reports \"MANY different medications\" at time of first episode of psychosis before eventually settling on clozapine, which she has been on since that time.                Past Medical History     Patient Active Problem List   Diagnosis    Seizure disorder (H)    Gastroesophageal reflux disease    Hypertriglyceridemia    Schizoaffective disorder, bipolar type (H)    Pervasive developmental disorder    Drug-induced constipation    Moderate persistent asthma    Microcephaly (H)                   Medications     Current Outpatient Medications   Medication Sig Dispense Refill    albuterol (PROAIR HFA/PROVENTIL HFA/VENTOLIN HFA) 108 (90 Base) MCG/ACT inhaler Inhale 1-2 puffs into the lungs      bisacodyl (DULCOLAX) 10 MG suppository Place 1 suppository (10 mg) rectally daily as needed for constipation - to be used if 3 days without a bowel movement (Patient not taking: Reported on 8/6/2024) 30 suppository 2    calcium carbonate (OS-SHARMIN 500 MG [place]) " 500 MG tablet Take 500 mg by mouth daily      cloZAPine (CLOZARIL) 100 MG tablet Take 1 tablet (100 mg) by mouth every morning AND 2 tablets (200 mg) at bedtime. 90 tablet 1    Cyanocobalamin (B-12 PO) Take by mouth daily (Patient not taking: Reported on 8/6/2024)      ferrous sulfate (FEROSUL) 325 (65 Fe) MG tablet Take 325 mg by mouth daily (with breakfast)      Fluticasone Propionate, Inhal, (FLOVENT IN) Inhale 1 puff into the lungs 2 times daily      levETIRAcetam (KEPPRA) 1000 MG tablet Take 1 tablet (1,000 mg) by mouth every evening 90 tablet 3    levETIRAcetam (KEPPRA) 750 MG tablet Take 1 tablet (750 mg) by mouth every morning 90 tablet 3    magnesium oxide 400 MG CAPS Take 1 capsule by mouth daily      pantoprazole (PROTONIX) 40 MG EC tablet Take 1 tablet by mouth daily before breakfast      polyethylene glycol (MIRALAX) 17 GM/Dose powder Take 17-34 g (1-2 Capfuls) by mouth daily 850 g 2                 Data         5/9/2024    10:33 AM 5/22/2024     3:11 PM 10/7/2024    10:48 AM   PROMIS-10 Total Score w/o Sub Scores   PROMIS TOTAL - SUBSCORES 34 33 29         10/11/2023     1:56 PM   CAGE-AID Total Score   Total Score 0   Total Score MyChart 0 (A total score of 2 or greater is considered clinically significant)         10/11/2023     1:36 PM 12/20/2023     1:04 PM   PHQ-9 SCORE   PHQ-9 Total Score MyChart 0 3 (Minimal depression)   PHQ-9 Total Score 0 3     No lab results found.  No lab results found.  No lab results found.  Recent Labs   Lab Test 03/22/24  0000 02/20/24  0000 01/22/24  1406   WBC 3.8 4.7 5.2   HGB 11.7 11.3 11.3     Recent Labs   Lab Test 05/14/24  0832   CLOZQT 258*     Recent Labs   Lab Test 05/14/24  0832   CLOZMT 502   combined therapeutic range: 450-500 ng/mL;  caution adverse effects 800-1200 ng/mL     ECG 5/3/24 QTc = 417ms      PROVIDER: Cem Bales MD    Level of Medical Decision Making:   - At least 2 stable chronic problems  - Engaged in prescription drug management  during visit (discussed any medication benefits, side effects, alternatives, etc.)     The longitudinal plan of care for the diagnosis(es)/condition(s) as documented were addressed during this visit. Due to the added complexity in care, I will continue to support Gabriela in the subsequent management and with ongoing continuity of care.    50 min spent on the date of the encounter in chart review, patient visit, review of tests, documentation, care coordination, and/or discussion with other providers about the issues documented above.

## 2024-10-08 NOTE — NURSING NOTE
Current patient location: [address] [place] [address] [address] [facility] [address]    Is the patient currently in the state of [place]? YES    Visit mode:VIDEO    If the visit is dropped, the patient can be reconnected by: VIDEO VISIT: Send to e-mail at: [email or web address]     Mom  [email or web address]    Group Home    Will anyone else be joining the visit? [facility] [place] and  staff  (If patient encounters technical issues they should call [phone]    Are changes needed to the allergy or medication list? No    Are refills needed on medications prescribed by this physician? NO    Rooming Documentation:  pt not at check in to complete phq    Reason for visit: RECHECK    Rosio CLINEF

## 2025-01-07 ASSESSMENT — PATIENT HEALTH QUESTIONNAIRE - PHQ9
SUM OF ALL RESPONSES TO PHQ QUESTIONS 1-9: 0
SUM OF ALL RESPONSES TO PHQ QUESTIONS 1-9: 0

## 2025-01-08 ENCOUNTER — VIRTUAL VISIT (OUTPATIENT)
Dept: PSYCHIATRY | Facility: CLINIC | Age: 45
End: 2025-01-08
Attending: PSYCHIATRY & NEUROLOGY
Payer: MEDICARE

## 2025-01-08 DIAGNOSIS — K59.03 DRUG-INDUCED CONSTIPATION: ICD-10-CM

## 2025-01-08 DIAGNOSIS — F25.0 SCHIZOAFFECTIVE DISORDER, BIPOLAR TYPE (H): Primary | ICD-10-CM

## 2025-01-08 RX ORDER — CLOZAPINE 100 MG/1
TABLET ORAL
Qty: 90 TABLET | Refills: 11 | Status: SHIPPED | OUTPATIENT
Start: 2025-01-08

## 2025-01-08 RX ORDER — POLYETHYLENE GLYCOL 3350 17 G/17G
1-2 POWDER, FOR SOLUTION ORAL DAILY
Qty: 850 G | Refills: 3 | Status: SHIPPED | OUTPATIENT
Start: 2025-01-08

## 2025-01-08 RX ORDER — MAGNESIUM OXIDE 400 MG/1
400 TABLET ORAL DAILY
COMMUNITY
Start: 2024-02-06

## 2025-01-08 ASSESSMENT — PAIN SCALES - GENERAL: PAINLEVEL_OUTOF10: NO PAIN (0)

## 2025-01-08 NOTE — PATIENT INSTRUCTIONS
**For crisis resources, please see the information at the end of this document**   Patient Education    Thank you for coming to the [facility] MENTAL HEALTH & ADDICTION [place] CLINIC.     Lab Testing:  If you had lab testing today and your results are reassuring or normal they will be mailed to you or sent through Zoom[employer]et within 7 days. If the lab tests need quick action we will call you with the results. The phone number we will call with results is # [phone]. If this is not the best number please call our clinic and change the number.     Medication Refills:  If you need any refills please call your pharmacy and they will contact us. Our fax number for refills is [phone].   Three business days of notice are needed for general medication refill requests.   Five business days of notice are needed for controlled substance refill requests.   If you need to change to a different pharmacy, please contact the new pharmacy directly. The new pharmacy will help you get your medications transferred.     Contact Us:  Please call [phone] during business hours (8-5:00 M-F).   If you have medication related questions after clinic hours, or on the weekend, please call [phone].     Financial Assistance [phone]   Medical Records [phone]       MENTAL HEALTH CRISIS RESOURCES:  For a emergency help, please call 911 or go to the nearest Emergency Department.     Emergency Walk-In Options:   EmPATH Unit @ [facility] ([place]): [phone] - Specialized mental health emergency area designed to be calming  [facility] ([place]): [phone]  [facility] ([place]): [phone]  [facility]): [phone]    [facility] Crisis Information:   [place]: [phone]  [place]: [phone]  Jana (NASIMA) - Adult: [phone]     Child: [phone]  Jarod - Adult: [phone]     Child: [phone]  [place]:  [phone]  List of all [facility] resources:   https://[place].gov/dhs/people-we-serve/adults/health-care/mental-health/resources/crisis-contacts.jsp    National Crisis Information:   Crisis Text Line: Text  [place]  to 068342  Suicide & Crisis Lifeline: 988  National Suicide Prevention Lifeline: 3-209-286-TALK (1-672.839.6065)       For online chat options, visit https://suicidepreventionlifeline.org/chat/  Poison Control Center: 2-445-650-4008  Trans Lifeline: 4-050-428-6857 - Hotline for transgender people of all ages  The Mino Project: 0-005-943-9014 - Hotline for LGBT youth     For Non-Emergency Support:   Fast Tracker: Mental Health & Substance Use Disorder Resources -   https://www.[employer] Technology[employer]Knoticen.org/

## 2025-01-08 NOTE — PROGRESS NOTES
Virtual Visit Details  Type of service:  Video Visit   Start Time: 10:35 AM  End Time: 10:42 AM  Originating Location (pt. Location): Home  Distant Location (provider location):  Off-site  Platform used for Video Visit: [facility]  MEDICAL PROGRESS NOTE  Brief Care Team  Clozapine Bridging to Long Term Care       CARE TEAM:    PCP- Mary Soliman with [facility]  Therapist- None  Neuro- Dr. Seals    Gabriela is a 44 year old who uses the pronouns she, her, hers.   Mother Slime is legal guardian and routinely attends appointments.                 Assessment & Plan     Gabriela is seen in this clinic for follow-up and bridging of care for clozapine management following the group home of her long-term clozapine provider, Dr. Spaulding.      # Schizoaffective disorder, bipolar type: Gabriela is currently at baseline functioning and behavior. No adjustment needed in clozapine at this time. There is room to increase the dose if needed.   - Continue clozapine 100mg QAM and 200mg at bedtime     Labs- *Must fax lab orders to [facility] - [phone]*  - Monthly CBC/ANC clozapine monitoring done via [facility]  - Annual SGA monitoring includes CMP, A1c, fasting lipids. Due: 02/2025  - May consider whole exome testing for genetic variants associated with neuro developmental delay, seizures, ASD, psychosis, or catatonia in the future if noting deterioration in function    Rating scales- AIMS due: currently     # Pervasive developmental delay/microcephaly/seizure disorder: Per hx, Gabriela has not had genetic screening done in the past. Given her medical and psychiatric history, including abrupt onset of catatonic symptoms at age 14, the suspicion for possible genetic syndrome is elevated. Having additional information on this wouldn't likely change the current course of care, but could be beneficial in case of worsening symptoms in the  future.   - Neuro managed levetiracetam (Keppra) for seizures     # Constipation 2/2 clozapine: Constipation improved with addition of prune juice and she is now having bowel movements regularly. No changes to constipation regimen at this time.  - Continue daily glass of prune juice for constipation  - Continue Miralax 1-2 capfuls daily   - Continue bisacodyl (Dulcolax) 10mg suppository as needed if no bowel movements for 3 days    Disposition: Return to clinic in 8-12 weeks. Brief Care Team is looking to identify outside long-term providers to manage ongoing clozapine.                    Interval History   Gabriela notes that she is doing really well, no tripping / falling. Did well over the holidays, kept up with conversations, was helping with things, didn't hide in her room.   Was previously seeing Dr. Tyree Spaulding with [facility] until he moved away, then Dr. Balbuena suggested our clinic for long term clozapine management.   Will be having PCP appointment in March, and will get annual monitoring labs checked at that time.   They note that Gabriela has been doing pretty well ever since the last dose adjustment last year. She has been feeling pretty happy, and has not had any significant symptoms.        Social History Update:  Financial/occupational: SSDI, family support  Living situation (partner, children, pets, etc): Lives with 3 roommates and 2 staff members at [employer] since age 20     Pertinent Substance Use:   Alcohol: No   Cannabis: No  Tobacco: No  Caffeine:  Drinks coffee daily                   Physical Exam  (Vitals Only)    There were no vitals taken for this visit.  Pulse Readings from Last 3 Encounters:   11/22/23 98   10/11/23 105   04/24/19 95     Wt Readings from Last 3 Encounters:   11/22/23 63.4 kg (139 lb 12.8 oz)   10/11/23 63.2 kg (139 lb 6.4 oz)   04/24/19 70.4 kg (155 lb 4.8 oz)     BP Readings from Last 3 Encounters:   11/22/23 123/80   10/11/23 125/80   04/24/19 141/85  "                 Mental Status Exam    Alertness: alert and slow to respond at times  Appearance: adequately groomed - microcephalic  Behavior/Demeanor: cooperative, pleasant, and calm, with  intermittent eye contact   Speech: increased latency of response and followed by period of more rapid speech  Language: intact  Psychomotor:  Intermittent movements of feet, appear unnoticed by patient  Mood: description consistent with euthymia  Affect: restricted but followed with more reactive affect when actively engaged in the conversation; congruent to: mood- yes, content- yes  Thought Process/Associations: unremarkable  Thought Content:  Reports none;  Denies suicidal & violent ideation and delusions  Perception:  Reports none;  Denies hallucinations  Insight: adequate with support from family, limited by known developmental delays  Judgment: adequate for safety  Cognition:  Known pervasive developmental delay  Gait and Station:  Ambulates with support of rollerator for stability (during this virtual visit she remained seated)                  Past Psychiatric History - Summary points of care since 07/2024    Past Psychotropic Medication Trials   Medication Max Dose (mg) Dates / Duration Helpful? DC Reason / Adverse Effects?   clozapine 600 Age 14 Y Slowly scaled back due to high serum levels   lithium 300 Until 6/2023 Y Stopped due to elevated Cr on labs     Mother reports \"MANY different medications\" at time of first episode of psychosis before eventually settling on clozapine, which she has been on since that time.                   Past Medical History     Patient Active Problem List   Diagnosis    Seizure disorder (H)    Gastroesophageal reflux disease    Hypertriglyceridemia    Schizoaffective disorder, bipolar type (H)    Pervasive developmental disorder    Drug-induced constipation    Moderate persistent asthma    Microcephaly (H)                   Medications     Current Outpatient Medications   Medication Sig " Dispense Refill    acetaminophen (TYLENOL) 500 MG tablet Take 500-1,000 mg by mouth every 6 hours as needed for pain (headache).      albuterol (PROAIR HFA/PROVENTIL HFA/VENTOLIN HFA) 108 (90 Base) MCG/ACT inhaler Inhale 1-2 puffs into the lungs      ARNUITY ELLIPTA 50 MCG/ACT inhaler       calcium carbonate-vitamin D (CALTRATE) 600-10 MG-MCG per tablet Take 1 tablet by mouth every morning.      cloZAPine (CLOZARIL) 100 MG tablet Take 1 tablet (100 mg) by mouth every morning AND 2 tablets (200 mg) at bedtime. 90 tablet 3    ferrous sulfate (FEROSUL) 325 (65 Fe) MG tablet Take 325 mg by mouth daily (with breakfast)      Fluticasone Propionate, Inhal, (FLOVENT IN) Inhale 1 puff into the lungs 2 times daily      levETIRAcetam (KEPPRA) 1000 MG tablet Take 1 tablet (1,000 mg) by mouth every morning. 90 tablet 3    levETIRAcetam (KEPPRA) 750 MG tablet Take 1 tablet (750 mg) by mouth every evening. 90 tablet 3    magnesium oxide 400 MG CAPS Take 1 capsule by mouth daily      pantoprazole (PROTONIX) 40 MG EC tablet Take 1 tablet by mouth daily before breakfast      polyethylene glycol (MIRALAX) 17 GM/Dose powder Take 17-34 g (1-2 Capfuls) by mouth daily 850 g 2    prune juice LIQD Take by mouth daily.      bisacodyl (DULCOLAX) 10 MG suppository Place 1 suppository (10 mg) rectally daily as needed for constipation - to be used if 3 days without a bowel movement (Patient not taking: Reported on 1/8/2025) 30 suppository 2    Cyanocobalamin (B-12 PO) Take by mouth daily (Patient not taking: Reported on 1/8/2025)                   Data         5/22/2024     3:11 PM 10/7/2024    10:48 AM 1/3/2025    12:09 PM   PROMIS-10 Total Score w/o Sub Scores   PROMIS TOTAL - SUBSCORES 33 29 25        Proxy-reported         10/11/2023     1:36 PM 12/20/2023     1:04 PM 1/7/2025    11:25 AM   PHQ-9 SCORE   PHQ-9 Total Score MyChart 0 3 (Minimal depression) 0   PHQ-9 Total Score 0 3 0        Proxy-reported       Recent Labs   Lab Test  03/22/24  0000 02/20/24  0000 01/22/24  1406   WBC 3.8 4.7 5.2   HGB 11.7 11.3 11.3     Recent Labs   Lab Test 05/14/24  0832   CLOZQT 258*     Recent Labs   Lab Test 05/14/24  0832   CLOZMT 502   combined therapeutic range: 450-500 ng/mL;  caution adverse effects 800-1200 ng/mL     ECG 5/3/24 QTc = 417ms      PROVIDER: Cem Bales MD    Level of Medical Decision Making:   - At least 2 stable chronic problems  - Engaged in prescription drug management during visit (discussed any medication benefits, side effects, alternatives, etc.)     The longitudinal plan of care for the diagnosis(es)/condition(s) as documented were addressed during this visit. Due to the added complexity in care, I will continue to support Gabriela in the subsequent management and with ongoing continuity of care.

## 2025-01-08 NOTE — NURSING NOTE
Current patient location:  [facility]     Is the patient currently in the state of [place]? YES    Visit mode:VIDEO    If the visit is dropped, the patient can be reconnected by:VIDEO VISIT: Text to cell phone:   Telephone Information:   Mobile [phone]       Will anyone else be joining the visit? NO  (If patient encounters technical issues they should call [phone]    Are changes needed to the allergy or medication list? No    Are refills needed on medications prescribed by this physician? Discuss with provider    Rooming Documentation:  Questionnaire(s) completed    Reason for visit: ESTHER CLINEF

## 2025-01-31 ENCOUNTER — TELEPHONE (OUTPATIENT)
Dept: NEUROLOGY | Facility: CLINIC | Age: 45
End: 2025-01-31
Payer: MEDICARE

## 2025-01-31 NOTE — TELEPHONE ENCOUNTER
Prior Authorization Retail Medication Request    Medication/Dose: Levetiracetam  Diagnosis and ICD code (if different than what is on RX):  Seizures (H) [R56.9]   New/renewal/insurance change PA/secondary ins. PA:  Previously Tried and Failed:    Rationale:      Insurance   Primary:   Insurance ID:      Secondary (if applicable):  Insurance ID:    Pharmacy Information (if different than what is on RX)  Name:  [facility] - [place], [place] - [address] [place] [address]   Phone:  [phone]   Fax:[phone]     Covermymeds  HERNANDEZ: [id number]    Clinic Information  Preferred routing pool for dept communication: P [phone]

## 2025-02-05 NOTE — TELEPHONE ENCOUNTER
Prior Authorization Not Needed per Insurance    Medication: LEVETIRACETAM 1000 MG PO TABS  Insurance Company: [employer] - Phone [phone] Fax [phone]  Expected CoPay: $    Pharmacy Filling the Rx: [facility] - [place], [place] - [address] [place] [address]  Pharmacy Notified: YES      Patient Notified: Instructed pharmacy to notify patient once order is ready.

## 2025-02-11 ENCOUNTER — MYC MEDICAL ADVICE (OUTPATIENT)
Dept: PSYCHIATRY | Facility: CLINIC | Age: 45
End: 2025-02-11
Payer: MEDICARE

## 2025-02-11 DIAGNOSIS — F25.0 SCHIZOAFFECTIVE DISORDER, BIPOLAR TYPE (H): ICD-10-CM

## 2025-02-12 RX ORDER — CLOZAPINE 100 MG/1
TABLET ORAL
Qty: 105 TABLET | Refills: 2 | Status: SHIPPED | OUTPATIENT
Start: 2025-02-12

## 2025-02-12 ASSESSMENT — PATIENT HEALTH QUESTIONNAIRE - PHQ9
SUM OF ALL RESPONSES TO PHQ QUESTIONS 1-9: 5
10. IF YOU CHECKED OFF ANY PROBLEMS, HOW DIFFICULT HAVE THESE PROBLEMS MADE IT FOR YOU TO DO YOUR WORK, TAKE CARE OF THINGS AT HOME, OR GET ALONG WITH OTHER PEOPLE: SOMEWHAT DIFFICULT
SUM OF ALL RESPONSES TO PHQ QUESTIONS 1-9: 5

## 2025-02-12 ASSESSMENT — ANXIETY QUESTIONNAIRES
3. WORRYING TOO MUCH ABOUT DIFFERENT THINGS: NOT AT ALL
7. FEELING AFRAID AS IF SOMETHING AWFUL MIGHT HAPPEN: NOT AT ALL
6. BECOMING EASILY ANNOYED OR IRRITABLE: SEVERAL DAYS
5. BEING SO RESTLESS THAT IT IS HARD TO SIT STILL: MORE THAN HALF THE DAYS
GAD7 TOTAL SCORE: 5
8. IF YOU CHECKED OFF ANY PROBLEMS, HOW DIFFICULT HAVE THESE MADE IT FOR YOU TO DO YOUR WORK, TAKE CARE OF THINGS AT HOME, OR GET ALONG WITH OTHER PEOPLE?: SOMEWHAT DIFFICULT
GAD7 TOTAL SCORE: 5
2. NOT BEING ABLE TO STOP OR CONTROL WORRYING: NOT AT ALL
GAD7 TOTAL SCORE: 5
7. FEELING AFRAID AS IF SOMETHING AWFUL MIGHT HAPPEN: NOT AT ALL
1. FEELING NERVOUS, ANXIOUS, OR ON EDGE: SEVERAL DAYS
IF YOU CHECKED OFF ANY PROBLEMS ON THIS QUESTIONNAIRE, HOW DIFFICULT HAVE THESE PROBLEMS MADE IT FOR YOU TO DO YOUR WORK, TAKE CARE OF THINGS AT HOME, OR GET ALONG WITH OTHER PEOPLE: SOMEWHAT DIFFICULT
4. TROUBLE RELAXING: SEVERAL DAYS

## 2025-02-13 ENCOUNTER — VIRTUAL VISIT (OUTPATIENT)
Dept: PSYCHIATRY | Facility: CLINIC | Age: 45
End: 2025-02-13
Attending: PSYCHIATRY & NEUROLOGY
Payer: MEDICARE

## 2025-02-13 VITALS — WEIGHT: 137 LBS

## 2025-02-13 DIAGNOSIS — F25.0 SCHIZOAFFECTIVE DISORDER, BIPOLAR TYPE (H): Primary | ICD-10-CM

## 2025-02-13 PROCEDURE — G2211 COMPLEX E/M VISIT ADD ON: HCPCS | Mod: 95

## 2025-02-13 PROCEDURE — 98006 SYNCH AUDIO-VIDEO EST MOD 30: CPT | Mod: GC

## 2025-02-13 ASSESSMENT — PAIN SCALES - GENERAL: PAINLEVEL_OUTOF10: NO PAIN (0)

## 2025-02-13 NOTE — PATIENT INSTRUCTIONS
- Please check CBC and Clozapine level  - Let us know if there is no change or worsening of symptoms    **For crisis resources, please see the information at the end of this document**   Patient Education    Thank you for coming to the [facility] [place] [facility].     Lab Testing:  If you had lab testing today and your results are reassuring or normal they will be mailed to you or sent through i.TV within 7 days. If the lab tests need quick action we will call you with the results. The phone number we will call with results is # [phone]. If this is not the best number please call our clinic and change the number.     Medication Refills:  If you need any refills please call your pharmacy and they will contact us. Our fax number for refills is [phone].   Three business days of notice are needed for general medication refill requests.   Five business days of notice are needed for controlled substance refill requests.   If you need to change to a different pharmacy, please contact the new pharmacy directly. The new pharmacy will help you get your medications transferred.     Contact Us:  Please call [phone] during business hours (8-5:00 M-F).   If you have medication related questions after clinic hours, or on the weekend, please call [phone].     Financial Assistance [phone]   Medical Records [phone]       MENTAL HEALTH CRISIS RESOURCES:  For a emergency help, please call 911 or go to the nearest Emergency Department.     Emergency Walk-In Options:   [facility] Unit @ [facility] ([place]): [phone] - Specialized mental health emergency area designed to be calming  [facility] ([place]): [phone]  [facility] [place] [place] [facility] ([place]): [phone]  [facility]): [phone]    [facility] Crisis Information:   [place]: [phone]  [place]: [phone]  [place] (NASIMA) - Adult: [phone]      Child: [phone]  [place] - Adult: [phone]     Child: [phone]  [place]: [phone]  List of all [facility] resources:   https://[place].gov/dhs/people-we-serve/adults/health-care/mental-health/resources/crisis-contacts.jsp    National Crisis Information:   Crisis Text Line: Text  [place]  to 068449  Suicide & Crisis Lifeline: 988  National Suicide Prevention Lifeline: 7-404-253-TALK (1-865.843.3048)       For online chat options, visit https://suicidepreventionlifeline.org/chat/  Poison Control Center: 4-562-690-2102  Trans Lifeline: 1-629.958.5988 - Hotline for transgender people of all ages  The Mino Project: 4-444-416-4776 - Hotline for LGBT youth     For Non-Emergency Support:   Fast Tracker: Mental Health & Substance Use Disorder Resources -   https://www.ITS KOOLn.org/

## 2025-02-13 NOTE — NURSING NOTE
Current patient location: [address] [place] [address] [address] [facility] [address]    Is the patient currently in the state of [place]? YES    Visit mode: VIDEO    If the visit is dropped, the patient can be reconnected by:VIDEO VISIT: Text to cell phone:   Telephone Information:   Mobile [phone]       Will anyone else be joining the visit? NO  (If patient encounters technical issues they should call [phone])    Are changes needed to the allergy or medication list? No    Are refills needed on medications prescribed by this physician? NO    Rooming Documentation:  Questionnaire(s) completed    Reason for visit: RECHECK (F/U)    Delicia RUSHING

## 2025-02-13 NOTE — PROGRESS NOTES
"Virtual Visit Details    Type of service:  Video Visit   Video Start Time: {video visit start/end time for provider to select:938906}  Video End Time:{video visit start/end time for provider to select:688196}    Originating Location (pt. Location): {video visit patient location:273551::\"Home\"}  {PROVIDER LOCATION On-site should be selected for visits conducted from your clinic location or adjoining [facility] hospital, academic office, or other nearby [facility] building. Off-site should be selected for all other provider locations, including home:353367}  Distant Location (provider location):  {virtual location provider:127335}  Platform used for Video Visit: {Virtual Visit Platforms:378652::\"StandardNine\"}    "

## 2025-02-13 NOTE — PROGRESS NOTES
Virtual Visit Details  Type of service:  Video Visit   Start Time: 10:10AM  End Time: 10:40AM  Originating Location (pt. Location): Home  Distant Location (provider location):  On-site  Platform used for Video Visit: [facility]  MEDICAL PROGRESS NOTE  Brief Care Team  Transfer of care visit       CARE TEAM:    PCP- Mary Soliman with [facility]  Therapist- None  Neuro- Dr. Seals    Gabriela is a 44 year old who uses the pronouns she, her, hers.   Mother Slime is legal guardian and routinely attends appointments.                 Assessment & Plan      # Schizoaffective disorder, bipolar type: Gabriela experienced worsening of psychosis recently. Most recent clozapine level was 258 (4/2024) which is lower than typical therapeutic range. We will increase it to total of 350mg by changing bedtime clozapine to 250mg. There is room to increase the dose if needed.   - Continue clozapine 100mg QAM and Increase to 250mg at bedtime     Labs- *Must fax lab orders to [facility] - [phone]*  - Monthly CBC/ANC clozapine monitoring done via [facility]  - Annual SGA monitoring includes CMP, A1c, fasting lipids. Due: 02/2025  - May consider whole exome testing for genetic variants associated with neuro developmental delay, seizures, ASD, psychosis, or catatonia in the future if noting deterioration in function    Rating scales- AIMS due: currently     # Pervasive developmental delay/microcephaly/seizure disorder: Per hx, Gabriela has not had genetic screening done in the past. Given her medical and psychiatric history, including abrupt onset of catatonic symptoms at age 14, the suspicion for possible genetic syndrome is elevated. Having additional information on this wouldn't likely change the current course of care, but could be beneficial in case of worsening symptoms in the future.   - Neuro managed levetiracetam (Keppra) for  "seizures     # Constipation 2/2 clozapine: Constipation improved with addition of prune juice and she is now having bowel movements regularly. No changes to constipation regimen at this time.  - Continue daily glass of prune juice for constipation  - Continue Miralax 1-2 capfuls daily   - Continue bisacodyl (Dulcolax) 10mg suppository as needed if no bowel movements for 3 days    Disposition: Return to clinic in 4 weeks.     _______________________________________________________________________________  Pertinent background  (From Dr. Nelson's note 5/22/2024)    Gabriela has a known medical history of developmental delay and microcephaly; she has not had previous genetic workup and minimal family is known due to being adopted. Gabriela first experienced symptoms of ADHD and was treated for this in 2nd grade. At age 14, family noted prodromal symptoms of hyper-fixation on specific movies, TV shows, and other activities. At age 14 while at a [employer] became unresponsive and \"catatonic\" prompting emergent transportation to the hospital and prolonged 2 month hospitalization. Multiple trials of medications during this period with minimal efficacy. Eventually given a trial of clozapine with marked improvement in symptoms. Of note, after this period retained little to no memory of life events prior to age 7, with the exception of some sign language she had learned earlier. Additionally, during this period she lost the ability to perform basic activities she had been able to do previously, including tying her shoes, cooking, and other activities. ECT not used to treat that catatonic episode. More recently there had been concerns for cognitive declines and possible concerns for dementia, but an increase in Keppra may also have been a factor. Has not ever had genetic workup for microcephaly, seizure disorder, and psychiatric concerns. Previously on high doses of clozapine augmented with lithium. Lithium tapered and " "discontinued in the setting of Cr elevation with no notable destabilization with discontinuation. Clozapine had also been reduced previously due to elevated drug levels on monitoring.     Pertinent items include: psychosis , all , mutiple psychotropic trials , and major medical problems                 Interval History   - Slime was with Gabriela today. Slime reports that group home staff noticed increasing delusion and irritability/anger over the past 6 weeks. They noticed that Gabriela has been having delusional of being a orozco at her dad's band and she was 1 years old when he had a band. This delusion was triggered by recent show that had a song from the movie that she loves. Of note, staffs and Slime states Winnie has been obsessed with that movie, watch again and again in her room. When the staffs tried to challenge her delusion, Gabriela became angry and irritable. They communicated this with Dr. Bales and they both agreed with increasing bedtime Clozapine to 250mg (total dose of 350mg).    - Gabriela reports she is feeling \"good\" and had a good sleep. Feeling safe at group home.  - No other safety concern was raised.  - We discussed Gabriela getting labs for CBC which she missed monthly check up in the January, as well as clozapine level to see if her clozapine level is within the therapeutic range (last time it was 258). And agreed to further increase clozapine if her symptoms of delusion and behavior does not improve and the clozapine level is low.    Social History Update:  Financial/occupational: SSDI, family support  Living situation (partner, children, pets, etc): Lives with 3 roommates and 2 staff members at [place] since age 20     Pertinent Substance Use:   Alcohol: No   Cannabis: No  Tobacco: No  Caffeine:  Drinks coffee daily                   Physical Exam  (Vitals Only)    There were no vitals taken for this visit.  Pulse Readings from Last 3 Encounters:   11/22/23 98   10/11/23 105   04/24/19 95     Wt " "Readings from Last 3 Encounters:   11/22/23 63.4 kg (139 lb 12.8 oz)   10/11/23 63.2 kg (139 lb 6.4 oz)   04/24/19 70.4 kg (155 lb 4.8 oz)     BP Readings from Last 3 Encounters:   11/22/23 123/80   10/11/23 125/80   04/24/19 141/85                  Mental Status Exam    Alertness: alert and slow to respond at times  Appearance: adequately groomed - microcephalic  Behavior/Demeanor: cooperative, pleasant, and calm, with  intermittent eye contact   Speech: increased latency of response and followed by period of more rapid speech  Language: intact  Psychomotor:  Intermittent movements of feet, appear unnoticed by patient  Mood: description consistent with euthymia  Affect: restricted but followed with more reactive affect when actively engaged in the conversation; congruent to: mood- yes, content- yes  Thought Process/Associations: unremarkable  Thought Content:  Reports none;  Denies suicidal & violent ideation and delusions  Perception:  Reports none;  Denies hallucinations  Insight: adequate with support from family, limited by known developmental delays  Judgment: adequate for safety  Cognition:  Known pervasive developmental delay  Gait and Station:  N/A                  Past Psychiatric History - Summary points of care since 07/2024    Past Psychotropic Medication Trials   Medication Max Dose (mg) Dates / Duration Helpful? DC Reason / Adverse Effects?   clozapine 600 Age 14 Y Slowly scaled back due to high serum levels   lithium 300 Until 6/2023 Y Stopped due to elevated Cr on labs     Mother reports \"MANY different medications\" at time of first episode of psychosis before eventually settling on clozapine, which she has been on since that time.                   Past Medical History     Patient Active Problem List   Diagnosis    Seizure disorder (H)    Gastroesophageal reflux disease    Hypertriglyceridemia    Schizoaffective disorder, bipolar type (H)    Pervasive developmental disorder    Drug-induced " constipation    Moderate persistent asthma    Microcephaly (H)                   Medications     Current Outpatient Medications   Medication Sig Dispense Refill    acetaminophen (TYLENOL) 500 MG tablet Take 500-1,000 mg by mouth every 6 hours as needed for pain (headache).      albuterol (PROAIR HFA/PROVENTIL HFA/VENTOLIN HFA) 108 (90 Base) MCG/ACT inhaler Inhale 1-2 puffs into the lungs      ARNUITY ELLIPTA 50 MCG/ACT inhaler       bisacodyl (DULCOLAX) 10 MG suppository Place 1 suppository (10 mg) rectally daily as needed for constipation - to be used if 3 days without a bowel movement (Patient not taking: Reported on 1/8/2025) 30 suppository 2    calcium carbonate-vitamin D (CALTRATE) 600-10 MG-MCG per tablet Take 1 tablet by mouth every morning.      cloZAPine (CLOZARIL) 100 MG tablet Take 1 tablet (100 mg) by mouth every morning AND 2.5 tablets (250 mg) at bedtime. 105 tablet 2    ferrous sulfate (FEROSUL) 325 (65 Fe) MG tablet Take 325 mg by mouth daily (with breakfast)      Fluticasone Propionate, Inhal, (FLOVENT IN) Inhale 1 puff into the lungs 2 times daily      levETIRAcetam (KEPPRA) 1000 MG tablet Take 1 tablet (1,000 mg) by mouth every morning. 90 tablet 3    levETIRAcetam (KEPPRA) 750 MG tablet Take 1 tablet (750 mg) by mouth every evening. 90 tablet 3    magnesium oxide (MAG-OX) 400 MG tablet Take 400 mg by mouth daily.      pantoprazole (PROTONIX) 40 MG EC tablet Take 1 tablet by mouth daily before breakfast      polyethylene glycol (MIRALAX) 17 GM/Dose powder Take 17-34 g (1-2 Capfuls) by mouth daily. 850 g 3    prune juice LIQD Take by mouth daily.                   Data         5/22/2024     3:11 PM 10/7/2024    10:48 AM 1/3/2025    12:09 PM   PROMIS-10 Total Score w/o Sub Scores   PROMIS TOTAL - SUBSCORES 33 29 25        Proxy-reported         12/20/2023     1:04 PM 1/7/2025    11:25 AM 2/12/2025    10:43 AM   PHQ-9 SCORE   PHQ-9 Total Score MyChart 3 (Minimal depression) 0  5 (Mild depression)     PHQ-9 Total Score 3 0  5        Proxy-reported       Recent Labs   Lab Test 03/22/24  0000 02/20/24  0000 01/22/24  1406   WBC 3.8 4.7 5.2   HGB 11.7 11.3 11.3     Recent Labs   Lab Test 05/14/24  0832   CLOZQT 258*     Recent Labs   Lab Test 05/14/24  0832   CLOZMT 502   combined therapeutic range: 450-500 ng/mL;  caution adverse effects 800-1200 ng/mL     ECG 5/3/24 QTc = 417ms      PROVIDER: Gustavo Zamora MD    Level of Medical Decision Making:   - At least 2 stable chronic problems  - Engaged in prescription drug management during visit (discussed any medication benefits, side effects, alternatives, etc.)     The longitudinal plan of care for the diagnosis(es)/condition(s) as documented were addressed during this visit. Due to the added complexity in care, I will continue to support Gabriela in the subsequent management and with ongoing continuity of care.  Answers submitted by the patient for this visit:  Patient Health Questionnaire (Submitted on 2/12/2025)  If you checked off any problems, how difficult have these problems made it for you to do your work, take care of things at home, or get along with other people?: Somewhat difficult  PHQ9 TOTAL SCORE: 5  Patient Health Questionnaire (G7) (Submitted on 2/12/2025)  JAYE 7 TOTAL SCORE: 5

## 2025-02-17 NOTE — PROGRESS NOTES
[facility] Neurology Follow Up Visit    Gabriela Juarez MRN# [id number]   Age: 44 year old YOB: 1980     Brief history of symptoms: The patient was initially seen in neurologic consultation on 1/28/2021 for evaluation of epilepsy. Please see the comprehensive neurologic consultation note from that date in the Epic records for details.          Assessment and Plan:   Assessment:  Gabriela Juarez is a 44 year old female who presents today for follow-up of epilepsy. She was initially seen in January 2021. Patient previously followed with Dr Jackson. Unclear from history when seizures first started. Her last seizure was in January 2023 and prior to that patient hasn't had a seizure in at least 10 years. Previous EEG from 2007 showed intermittent bifrontal spikes. MRI brain was normal in 2005. She is tolerating the same dosage of Keppra and we will continue it as below.       Plan:  - Continue Keppra 1000 mg AM and 750 mg evening    Follow up in Neurology clinic in 1 year or earlier as needed should new concerns arise.    Santosh Seals MD   of Neurology  [facility]  ----------------------------------------------    Interval history:   Patient fell in her room last night. She remembers standing there and bumping her head. She is unable give any other details.     Staff didn't notice anything unusual last.     She mentioned she hit her head last night this morning at breakfast.     She hasn't had any seizures noticed by staff.     She is tolerated Keppra medication.       Past Medical History:     Patient Active Problem List   Diagnosis    Seizure disorder (H)    Gastroesophageal reflux disease    Hypertriglyceridemia    Schizoaffective disorder, bipolar type (H)    Pervasive developmental disorder    Drug-induced constipation    Moderate persistent asthma    Microcephaly (H)     Past Medical History:   Diagnosis Date    B12 deficiency anemia 06/20/2007        Past Surgical History:   No  past surgical history on file.     Social History:     Social History     Tobacco Use    Smoking status: Former     Types: Cigarettes     Passive exposure: Never    Smokeless tobacco: Never   Vaping Use    Vaping status: Never Used   Substance Use Topics    Alcohol use: No        Family History:   No family history on file.     Medications:     Current Outpatient Medications   Medication Sig Dispense Refill    acetaminophen (TYLENOL) 500 MG tablet Take 500-1,000 mg by mouth every 6 hours as needed for pain (headache).      albuterol (PROAIR HFA/PROVENTIL HFA/VENTOLIN HFA) 108 (90 Base) MCG/ACT inhaler Inhale 1-2 puffs into the lungs      ARNUITY ELLIPTA 50 MCG/ACT inhaler       bisacodyl (DULCOLAX) 10 MG suppository Place 1 suppository (10 mg) rectally daily as needed for constipation - to be used if 3 days without a bowel movement (Patient not taking: Reported on 1/8/2025) 30 suppository 2    calcium carbonate-vitamin D (CALTRATE) 600-10 MG-MCG per tablet Take 1 tablet by mouth every morning.      cloZAPine (CLOZARIL) 100 MG tablet Take 1 tablet (100 mg) by mouth every morning AND 2.5 tablets (250 mg) at bedtime. 105 tablet 2    ferrous sulfate (FEROSUL) 325 (65 Fe) MG tablet Take 325 mg by mouth daily (with breakfast)      Fluticasone Propionate, Inhal, (FLOVENT IN) Inhale 1 puff into the lungs 2 times daily      levETIRAcetam (KEPPRA) 1000 MG tablet Take 1 tablet (1,000 mg) by mouth every morning. 90 tablet 3    levETIRAcetam (KEPPRA) 750 MG tablet Take 1 tablet (750 mg) by mouth every evening. 90 tablet 3    magnesium oxide (MAG-OX) 400 MG tablet Take 400 mg by mouth daily.      pantoprazole (PROTONIX) 40 MG EC tablet Take 1 tablet by mouth daily before breakfast      polyethylene glycol (MIRALAX) 17 GM/Dose powder Take 17-34 g (1-2 Capfuls) by mouth daily. 850 g 3    prune juice LIQD Take by mouth daily.       No current facility-administered medications for this visit.        Allergies:     Allergies    Allergen Reactions    Horse Epithelium      Shortness of breath        Review of Systems:   As above     Physical Exam:   Vitals: There were no vitals taken for this visit.   No examination given nature of virtual visit     Data reviewed on previous visits    Imaging:  MRI brain 2005   IMPRESSION:    Normal MRI of the brain.      Procedures:  EEG 2007   CONCLUSION: This is an abnormal routine EEG in a 26-year-old female   patient during wakefulness and drowsiness. There were frequent   intermittent bifrontal spikes and slow waves throughout the study which   were not increased in frequency during photic stimulation. This finding is   consistent with a seizure disorder.    Pertinent Investigations since last visit:   None    The longitudinal plan of care for the diagnosis(es)/condition(s) as documented were addressed during this visit. Due to the added complexity in care, I will continue to support Gabriela in the subsequent management and with ongoing continuity of care.

## 2025-02-19 ENCOUNTER — VIRTUAL VISIT (OUTPATIENT)
Dept: NEUROLOGY | Facility: CLINIC | Age: 45
End: 2025-02-19
Payer: MEDICARE

## 2025-02-19 DIAGNOSIS — R56.9 SEIZURES (H): Primary | ICD-10-CM

## 2025-02-19 NOTE — LETTER
2/19/2025      Gabriela Juarez  [address] [facility] [address]      Dear Colleague,    Thank you for referring your patient, Gabriela Juarez, to the [facility] [place]. Please see a copy of my visit note below.    [facility] Neurology Follow Up Visit    Gabriela Juarez MRN# [id number]   Age: 44 year old YOB: 1980     Brief history of symptoms: The patient was initially seen in neurologic consultation on 1/28/2021 for evaluation of epilepsy. Please see the comprehensive neurologic consultation note from that date in the Epic records for details.          Assessment and Plan:   Assessment:  Gabriela Juarez is a 44 year old female who presents today for follow-up of epilepsy. She was initially seen in January 2021. Patient previously followed with Dr Jackson. Unclear from history when seizures first started. Her last seizure was in January 2023 and prior to that patient hasn't had a seizure in at least 10 years. Previous EEG from 2007 showed intermittent bifrontal spikes. MRI brain was normal in 2005. She is tolerating the same dosage of Keppra and we will continue it as below.       Plan:  - Continue Keppra 1000 mg AM and 750 mg evening    Follow up in Neurology clinic in 1 year or earlier as needed should new concerns arise.    Santosh Seals MD   of Neurology  [facility]  ----------------------------------------------    Interval history:   Patient fell in her room last night. She remembers standing there and bumping her head. She is unable give any other details.     Staff didn't notice anything unusual last.     She mentioned she hit her head last night this morning at breakfast.     She hasn't had any seizures noticed by staff.     She is tolerated Keppra medication.       Past Medical History:     Patient Active Problem List   Diagnosis     Seizure disorder (H)     Gastroesophageal reflux disease     Hypertriglyceridemia     Schizoaffective  disorder, bipolar type (H)     Pervasive developmental disorder     Drug-induced constipation     Moderate persistent asthma     Microcephaly (H)     Past Medical History:   Diagnosis Date     B12 deficiency anemia 06/20/2007        Past Surgical History:   No past surgical history on file.     Social History:     Social History     Tobacco Use     Smoking status: Former     Types: Cigarettes     Passive exposure: Never     Smokeless tobacco: Never   Vaping Use     Vaping status: Never Used   Substance Use Topics     Alcohol use: No        Family History:   No family history on file.     Medications:     Current Outpatient Medications   Medication Sig Dispense Refill     acetaminophen (TYLENOL) 500 MG tablet Take 500-1,000 mg by mouth every 6 hours as needed for pain (headache).       albuterol (PROAIR HFA/PROVENTIL HFA/VENTOLIN HFA) 108 (90 Base) MCG/ACT inhaler Inhale 1-2 puffs into the lungs       ARNUITY ELLIPTA 50 MCG/ACT inhaler        bisacodyl (DULCOLAX) 10 MG suppository Place 1 suppository (10 mg) rectally daily as needed for constipation - to be used if 3 days without a bowel movement (Patient not taking: Reported on 1/8/2025) 30 suppository 2     calcium carbonate-vitamin D (CALTRATE) 600-10 MG-MCG per tablet Take 1 tablet by mouth every morning.       cloZAPine (CLOZARIL) 100 MG tablet Take 1 tablet (100 mg) by mouth every morning AND 2.5 tablets (250 mg) at bedtime. 105 tablet 2     ferrous sulfate (FEROSUL) 325 (65 Fe) MG tablet Take 325 mg by mouth daily (with breakfast)       Fluticasone Propionate, Inhal, (FLOVENT IN) Inhale 1 puff into the lungs 2 times daily       levETIRAcetam (KEPPRA) 1000 MG tablet Take 1 tablet (1,000 mg) by mouth every morning. 90 tablet 3     levETIRAcetam (KEPPRA) 750 MG tablet Take 1 tablet (750 mg) by mouth every evening. 90 tablet 3     magnesium oxide (MAG-OX) 400 MG tablet Take 400 mg by mouth daily.       pantoprazole (PROTONIX) 40 MG EC tablet Take 1 tablet by mouth  daily before breakfast       polyethylene glycol (MIRALAX) 17 GM/Dose powder Take 17-34 g (1-2 Capfuls) by mouth daily. 850 g 3     prune juice LIQD Take by mouth daily.       No current facility-administered medications for this visit.        Allergies:     Allergies   Allergen Reactions     Horse Epithelium      Shortness of breath        Review of Systems:   As above     Physical Exam:   Vitals: There were no vitals taken for this visit.   No examination given nature of virtual visit     Data reviewed on previous visits    Imaging:  MRI brain 2005   IMPRESSION:    Normal MRI of the brain.      Procedures:  EEG 2007   CONCLUSION: This is an abnormal routine EEG in a 26-year-old female   patient during wakefulness and drowsiness. There were frequent   intermittent bifrontal spikes and slow waves throughout the study which   were not increased in frequency during photic stimulation. This finding is   consistent with a seizure disorder.    Pertinent Investigations since last visit:   None    The longitudinal plan of care for the diagnosis(es)/condition(s) as documented were addressed during this visit. Due to the added complexity in care, I will continue to support Gabriela in the subsequent management and with ongoing continuity of care.      Gabriela is a 44 year old who is being evaluated via a billable video visit.    How would you like to obtain your AVS? MyChart  If the video visit is dropped, the invitation should be resent by: [phone]   Will anyone else be joining your video visit? No  {If patient encounters technical issues they should call [phone] :[id number]}  Video-Visit Details    Type of service:  Video Visit   Originating Location (pt. Location): Home  {PROVIDER LOCATION On-site should be selected for visits conducted from your clinic location or adjoining [facility] hospital, academic office, or other nearby [facility] building. Off-site should be selected for all other provider locations, including  home:[id number]}  Distant Location (provider location):  On-site      Again, thank you for allowing me to participate in the care of your patient.        Sincerely,        Santosh Seals MD    Electronically signed

## 2025-02-19 NOTE — PROGRESS NOTES
Gabriela is a 44 year old who is being evaluated via a billable video visit.    How would you like to obtain your AVS? [employer]  If the video visit is dropped, the invitation should be resent by: [phone]   Will anyone else be joining your video visit? No    Video-Visit Details    Type of service:  Video Visit   Originating Location (pt. Location): Home    Distant Location (provider location):  On-site

## 2025-03-12 NOTE — PROGRESS NOTES
Virtual Visit Details  Type of service:  Video Visit   Start Time: 09:55AM  End Time: 10:20AM  Originating Location (pt. Location): Home  Distant Location (provider location):  On-site  Platform used for Video Visit: [facility]  MEDICAL PROGRESS NOTE       CARE TEAM:    PCP- Mary Soliman with [facility]  Therapist- None  Neuro-  [place]    Gabriela is a 44 year old who uses the pronouns she, her, hers.   Mother Slime is legal guardian and routinely attends appointments.                 Assessment & Plan      # Schizoaffective disorder, bipolar type: Gabriela experienced worsening of psychosis recently but is doing better today.  Most recent clozapine level was 427 (2/2025) which is in the therapeutic range. We continue clozapine at 350mg. There is room to increase the dose if needed.     - Continue clozapine 100mg QAM and 250mg at bedtime     Labs- *Must fax lab orders to [facility] - [phone]*  - Monthly CBC/ANC clozapine monitoring done via [facility]  - Annual SGA monitoring includes CMP, A1c, fasting lipids. Due: 02/2025  - May consider whole exome testing for genetic variants associated with neuro developmental delay, seizures, ASD, psychosis, or catatonia in the future if noting deterioration in function    Rating scales- AIMS due: currently     # Pervasive developmental delay/microcephaly/seizure disorder: Per hx, Gabriela has not had genetic screening done in the past. Given her medical and psychiatric history, including abrupt onset of catatonic symptoms at age 14, the suspicion for possible genetic syndrome is elevated. Having additional information on this wouldn't likely change the current course of care, but could be beneficial in case of worsening symptoms in the future.   - Neuro managed levetiracetam (Keppra) for seizures     # Constipation 2/2 clozapine: Constipation improved with addition of prune  "juice and she is now having bowel movements regularly. No changes to constipation regimen at this time.  - Continue daily glass of prune juice for constipation  - Continue Miralax 1-2 capfuls daily   - Continue bisacodyl (Dulcolax) 10mg suppository as needed if no bowel movements for 3 days    Disposition: Return to clinic in 12 weeks.     _______________________________________________________________________________  Pertinent background  (From Dr. Nelson's note 5/22/2024)    Gabriela has a known medical history of developmental delay and microcephaly; she has not had previous genetic workup and minimal family is known due to being adopted. Gabriela first experienced symptoms of ADHD and was treated for this in 2nd grade. At age 14, family noted prodromal symptoms of hyper-fixation on specific movies, TV shows, and other activities. At age 14 while at [place] became unresponsive and \"catatonic\" prompting emergent transportation to the hospital and prolonged 2 month hospitalization. Multiple trials of medications during this period with minimal efficacy. Eventually given a trial of clozapine with marked improvement in symptoms. Of note, after this period retained little to no memory of life events prior to age 7, with the exception of some sign language she had learned earlier. Additionally, during this period she lost the ability to perform basic activities she had been able to do previously, including tying her shoes, cooking, and other activities. ECT not used to treat that catatonic episode. More recently there had been concerns for cognitive declines and possible concerns for dementia, but an increase in Keppra may also have been a factor. Has not ever had genetic workup for microcephaly, seizure disorder, and psychiatric concerns. Previously on high doses of clozapine augmented with lithium. Lithium tapered and discontinued in the setting of Cr elevation with no notable destabilization with " "discontinuation. Clozapine had also been reduced previously due to elevated drug levels on monitoring.     Pertinent items include: psychosis , all , mutiple psychotropic trials , and major medical problems                 Interval History   Since last visit:  - Met Gabriela with [facility] and group home staff Jazmin.  - Gabriela reports feeling \"good\" and sleep has been good. She feels safe at group home.Denied any delusion/AH/SI/HI. Slime and group home staff reports that Gabriela's delusion and irritability has been much improved since increasing clozapine to 350mg without any noticeable side effects including grogginess, increased constipation and abnormal movement. There has been no major behavioral/safety concerns recently. Gabriela has been having regular bowel movement with current constipation medication.  - Reviewed labs including latest clozapine 2/18 was 427 (prev count was 248 4/2024) and recommended getting HbA1c level when getting the next clozapine lab (next week).    Social History Update:  Financial/occupational: SSDI, family support  Living situation (partner, children, pets, etc): Lives with 3 roommates and 2 staff members at [employer] since age 20     Pertinent Substance Use:   Alcohol: No   Cannabis: No  Tobacco: No  Caffeine:  Drinks coffee daily                   Physical Exam  (Vitals Only)    There were no vitals taken for this visit.  Pulse Readings from Last 3 Encounters:   11/22/23 98   10/11/23 105   04/24/19 95     Wt Readings from Last 3 Encounters:   02/13/25 62.1 kg (137 lb)   11/22/23 63.4 kg (139 lb 12.8 oz)   10/11/23 63.2 kg (139 lb 6.4 oz)     BP Readings from Last 3 Encounters:   11/22/23 123/80   10/11/23 125/80   04/24/19 141/85                  Mental Status Exam    Alertness: alert and slow to respond at times  Appearance: adequately groomed - microcephalic  Behavior/Demeanor: cooperative, pleasant, and calm, with  intermittent eye contact   Speech: increased latency of response and " "followed by period of more rapid speech  Language: intact  Psychomotor:  Intermittent movements of feet, appear unnoticed by patient  Mood: description consistent with euthymia  Affect: restricted but followed with more reactive affect when actively engaged in the conversation; congruent to: mood- yes, content- yes  Thought Process/Associations: unremarkable  Thought Content:  Reports none;  Denies suicidal & violent ideation and delusions  Perception:  Reports none;  Denies hallucinations  Insight: adequate with support from family, limited by known developmental delays  Judgment: adequate for safety  Cognition:  Known pervasive developmental delay  Gait and Station:  N/A                  Past Psychiatric History - Summary points of care since 07/2024    Past Psychotropic Medication Trials   Medication Max Dose (mg) Dates / Duration Helpful? DC Reason / Adverse Effects?   clozapine 600 Age 14 Y Slowly scaled back due to high serum levels   lithium 300 Until 6/2023 Y Stopped due to elevated Cr on labs     Mother reports \"MANY different medications\" at time of first episode of psychosis before eventually settling on clozapine, which she has been on since that time.                   Past Medical History     Patient Active Problem List   Diagnosis    Seizure disorder (H)    Gastroesophageal reflux disease    Hypertriglyceridemia    Schizoaffective disorder, bipolar type (H)    Pervasive developmental disorder    Drug-induced constipation    Moderate persistent asthma    Microcephaly (H)                   Medications     Current Outpatient Medications   Medication Sig Dispense Refill    acetaminophen (TYLENOL) 500 MG tablet Take 500-1,000 mg by mouth every 6 hours as needed for pain (headache).      albuterol (PROAIR HFA/PROVENTIL HFA/VENTOLIN HFA) 108 (90 Base) MCG/ACT inhaler Inhale 1-2 puffs into the lungs      ARNUITY ELLIPTA 50 MCG/ACT inhaler       bisacodyl (DULCOLAX) 10 MG suppository Place 1 suppository (10 " mg) rectally daily as needed for constipation - to be used if 3 days without a bowel movement 30 suppository 2    calcium carbonate-vitamin D (CALTRATE) 600-10 MG-MCG per tablet Take 1 tablet by mouth every morning.      cloZAPine (CLOZARIL) 100 MG tablet Take 1 tablet (100 mg) by mouth every morning AND 2.5 tablets (250 mg) at bedtime. 105 tablet 2    ferrous sulfate (FEROSUL) 325 (65 Fe) MG tablet Take 325 mg by mouth daily (with breakfast)      Fluticasone Propionate, Inhal, (FLOVENT IN) Inhale 1 puff into the lungs 2 times daily      levETIRAcetam (KEPPRA) 1000 MG tablet Take 1 tablet (1,000 mg) by mouth every morning. 90 tablet 3    levETIRAcetam (KEPPRA) 750 MG tablet Take 1 tablet (750 mg) by mouth every evening. 90 tablet 3    magnesium oxide (MAG-OX) 400 MG tablet Take 400 mg by mouth daily.      pantoprazole (PROTONIX) 40 MG EC tablet Take 1 tablet by mouth daily before breakfast      polyethylene glycol (MIRALAX) 17 GM/Dose powder Take 17-34 g (1-2 Capfuls) by mouth daily. 850 g 3    prune juice LIQD Take by mouth daily.                   Data         5/22/2024     3:11 PM 10/7/2024    10:48 AM 1/3/2025    12:09 PM   PROMIS-10 Total Score w/o Sub Scores   PROMIS TOTAL - SUBSCORES 33 29 25        Proxy-reported         12/20/2023     1:04 PM 1/7/2025    11:25 AM 2/12/2025    10:43 AM   PHQ-9 SCORE   PHQ-9 Total Score MyChart 3 (Minimal depression) 0  5 (Mild depression)    PHQ-9 Total Score 3 0  5        Proxy-reported       Recent Labs   Lab Test 03/22/24  0000 02/20/24  0000 01/22/24  1406   WBC 3.8 4.7 5.2   HGB 11.7 11.3 11.3     Recent Labs   Lab Test 02/18/25  0807 05/14/24  0832   CLOZQT 427 258*     Recent Labs   Lab Test 05/14/24  0832   CLOZMT 502   combined therapeutic range: 450-500 ng/mL;  caution adverse effects 800-1200 ng/mL     ECG 5/3/24 QTc = 417ms      PROVIDER: Gustavo Zamora MD  This patient was staffed by Dr. Ramirez who agrees with my assessment and plan    Level of Medical Decision  Making:   - At least 2 stable chronic problems  - Engaged in prescription drug management during visit (discussed any medication benefits, side effects, alternatives, etc.)     The longitudinal plan of care for the diagnosis(es)/condition(s) as documented were addressed during this visit. Due to the added complexity in care, I will continue to support Gabriela in the subsequent management and with ongoing continuity of care.  Answers submitted by the patient for this visit:  Patient Health Questionnaire (Submitted on 2/12/2025)  If you checked off any problems, how difficult have these problems made it for you to do your work, take care of things at home, or get along with other people?: Somewhat difficult  PHQ9 TOTAL SCORE: 5  Patient Health Questionnaire (G7) (Submitted on 2/12/2025)  JAYE 7 TOTAL SCORE: 5

## 2025-03-13 ENCOUNTER — VIRTUAL VISIT (OUTPATIENT)
Dept: PSYCHIATRY | Facility: CLINIC | Age: 45
End: 2025-03-13
Attending: STUDENT IN AN ORGANIZED HEALTH CARE EDUCATION/TRAINING PROGRAM
Payer: MEDICARE

## 2025-03-13 VITALS — BODY MASS INDEX: 31.05 KG/M2 | WEIGHT: 138 LBS | HEIGHT: 56 IN

## 2025-03-13 DIAGNOSIS — F25.0 SCHIZOAFFECTIVE DISORDER, BIPOLAR TYPE (H): ICD-10-CM

## 2025-03-13 RX ORDER — CLOZAPINE 100 MG/1
TABLET ORAL
Qty: 105 TABLET | Refills: 2 | Status: CANCELLED | OUTPATIENT
Start: 2025-03-13

## 2025-03-13 NOTE — Clinical Note
"Natanael Chen  Thank you for your work on this note.  A quick feedback to please review the assessment and plan section to make sure your narrative matches the medication.  Here the assessment under Schizoaffective d/o was from previous note/unchanged suggesting that clozapine should be increased to 350 mg/d while the med said \"continue clozapine at 350 mg/day).   I edited your assessment to include new clozapine level and that she is doing better at this visit.  Please see it anything looks correct to you.    Thank you Kirsty"

## 2025-03-13 NOTE — NURSING NOTE
Current patient location: [address] [place] [address] [address] [facility] [address]    Is the patient currently in the state of [place]? YES    Visit mode: VIDEO    If the visit is dropped, the patient can be reconnected by:VIDEO VISIT: Text to cell phone:   Telephone Information:   Mobile [phone]       Will anyone else be joining the visit? Mom and group home staff  (If patient encounters technical issues they should call [phone])    Are changes needed to the allergy or medication list? No    Are refills needed on medications prescribed by this physician? Discuss medication before refills are sent     Rooming Documentation:  Questionnaire(s) not pre-assigned    Reason for visit: RECHECK    Rosio CLINEF

## 2025-03-13 NOTE — PATIENT INSTRUCTIONS
Hello! Please check HbA1c level when having next Clozapine lab    **For crisis resources, please see the information at the end of this document**   Patient Education    Thank you for coming to the [facility] [place] [facility].     Lab Testing:  If you had lab testing today and your results are reassuring or normal they will be mailed to you or sent through [employer] within 7 days. If the lab tests need quick action we will call you with the results. The phone number we will call with results is # [phone]. If this is not the best number please call our clinic and change the number.     Medication Refills:  If you need any refills please call your pharmacy and they will contact us. Our fax number for refills is [phone].   Three business days of notice are needed for general medication refill requests.   Five business days of notice are needed for controlled substance refill requests.   If you need to change to a different pharmacy, please contact the new pharmacy directly. The new pharmacy will help you get your medications transferred.     Contact Us:  Please call [phone] during business hours (8-5:00 M-F).   If you have medication related questions after clinic hours, or on the weekend, please call [phone].     Financial Assistance [phone]   Medical Records [phone]       MENTAL HEALTH CRISIS RESOURCES:  For a emergency help, please call 911 or go to the nearest Emergency Department.     Emergency Walk-In Options:   EmPATH Unit @ [facility] ([place]): [phone] - Specialized mental health emergency area designed to be calming  [facility] ([place]): [phone]  [facility] ([place]): [phone]  [facility]): [phone]    [facility] Crisis Information:   [place]: [phone]  [place]: [phone]  Jana (NASIMA) - Adult: [phone]     Child: [phone]  Jarod - Adult:  [phone]     Child: [phone]  [place]: [phone]  List of all [facility] resources:   https://[place].gov/dhs/people-we-serve/adults/health-care/mental-health/resources/crisis-contacts.jsp    National Crisis Information:   Crisis Text Line: Text  [place]  to 939500  Suicide & Crisis Lifeline: 988  National Suicide Prevention Lifeline: 9-223-600-TALK (1-950.289.8157)       For online chat options, visit https://suicidepreventionlifeline.org/chat/  Poison Control Center: 6-298-573-9287  Trans Lifeline: 5-092-477-6248 - Hotline for transgender people of all ages  The [employer] Project: 2-875-636-0295 - Hotline for LGBT youth     For Non-Emergency Support:   Fast Tracker: Mental Health & Substance Use Disorder Resources -   https://www.[employer].org/

## 2025-04-10 ENCOUNTER — TELEPHONE (OUTPATIENT)
Dept: PSYCHIATRY | Facility: CLINIC | Age: 45
End: 2025-04-10
Payer: MEDICARE

## 2025-04-10 DIAGNOSIS — F20.9 SCHIZOPHRENIA, UNSPECIFIED TYPE (H): Primary | ICD-10-CM

## 2025-04-10 NOTE — TELEPHONE ENCOUNTER
I called back the care giver (WOLF on file).   They said they are unable to make an appointment.      I said I would ask the Dr. Zamora to place the order.    They said they get the labs drawn at:    [facility]  [address] [address]  [phone]

## 2025-04-10 NOTE — TELEPHONE ENCOUNTER
[facility] Call Center    Phone Message    May a detailed message be left on voicemail: yes     Reason for Call: Other: Patients Caregiver called stating patient is unable to receive monthly labs due to no orders.      Caregiver requesting call back.     Action Taken: Other: [facility].     Travel Screening: Not Applicable

## 2025-04-14 NOTE — TELEPHONE ENCOUNTER
Patient's mom calling back. Patient went to clinic for the blood draw and they told her that they don't have orders.

## 2025-04-14 NOTE — TELEPHONE ENCOUNTER
Lab orders have been entered.  Based on prior RN note will fax to [address] [phone]  Update fax: [phone]    -Called lab to confirm, they requested we fax directly to them at [phone]  -Resent to number above and confirmed receipt by phone.   -Updated caregiverSlime.   -Reminder set for RNCC to ensure standing order is sent next month.

## 2025-04-21 NOTE — TELEPHONE ENCOUNTER
4/21: Pt mother called to share the lab did not have the order despite them confirming with writer on 4/10.  Resent to [phone]

## 2025-05-15 NOTE — PROGRESS NOTES
Video visit  Pt at home  Provider onsite  Start 1040  end 1120  [facility]  MEDICAL PROGRESS NOTE     CARE TEAM:    PCP- Melvina Balbuena  Therapist- None  Neuro- Dr. Seals    Gabriela is a 43 year old who uses the pronouns she, her, hers.      Diagnoses     Schizoaffective disorder, bipolar type    Other:  Pervasive developmental disorder  Seizure disorder  Microcephaly     Assessment     Gabriela is seen in this clinic for follow-up and bridging of care for clozapine management following the FDC of her long-term clozapine provider, Dr. Spaulding. Issues discussed are included below:    - Schizoaffective disorder, bipolar type: Per report from Gabriela's caregiver, Sepideh Man and Gabriela's mom Yanira, her symptoms have been worsening with her current regimen of clozapine 100mg BID.  They report she is getting more activated and agitated recently (during the last few weeks), her sleep is more disrupted, her hoarding behavior is getting worse, and she seems to be less happy recently.  They attribute new changes to the reduction of clozapine dose and are interested to have a higher clozapine dose and Gabriela agrees with this idea.    - Pervasive developmental delay/microcephaly/Seizure disorder: Per caregiver's report, has not ever had genetic screening done in the past. Additionally, recent notes indicate a more recent decline in cognition and function. From review of records this may have coincided with an increase in her Keppra dose following a seizure around 1 year ago. Given this potential decline, known developmental delay, and potentially abrupt onset of full catatonic symptoms at age 14 do raise some concern for some type of genetic syndrome. Could benefit from collaboration between neurology, PCP, and potentially a genetics specialist for a more nuanced evaluation of condition and potential progression.  At present, however, symptoms appear stable and unchanged from initial point of assessment.  They report further episodes of falling recently.    - Constipation 2/2 clozapine: Constipation notably improved with addition of prune juice and now having bowel movements regularly. No changes to constipation management plan at this time.    Psychotropic Drug Interactions:  [PSYCHCLINICDDI]  Increased risk for seizure: clozapine  Management: routine monitoring    [place]NPMP was not checked today: not using controlled substances    Risk Statements:   Treatment Risk- Risks, benefits, alternatives and potential adverse effects have been discussed and are understood.   Safety Risk-Gabriela did not appear to be an imminent safety risk to self or others.     Plan     1) Medications:   - Increase clozapine 100 mg QAM and 200 mg at bedtime with 50 mg increments each week  - Continue Miralax to 1-2 capfuls daily  - Continue Dulcolax 10mg suppository PRN if no bowel movements for 3 days  - Continue daily glass of prune juice for constipation    2) Psychotherapy: None, well-supported by staff at [facility]    3) Next due:  Labs- Continue monthly ANC monitoring for clozapine; will review chart for when next due for antipsychotic monitoring labs  Hemoglobin A1c, thyroid function, complete metabolic profile and 12-hour trough clozapine level is requested.  EKG- Routine monitoring is not indicated for current psychotropic medication regimen   Rating scales- AIMS: Score of 1 for minimal LE movement on 10/11/2023    4) Referrals:  Team will start looking to identify outside long-term providers to manage ongoing clozapine    5) Other: none: may consider whole exome testing for genetic variants associated with neuro developmental delay, seizures, ASD psychosis or catatonia in the future if noting deterioration in function  The nursing staff is going to call them in a week to make sure that the blood draw and increase in clozapine dose has  "happened.    6) Follow-up: Return to clinic in 3 weeks with the writer.     Pertinent Background                                                   [most recent eval 10/09/23]     Gabriela has a known medical history of developmental delay and microcephaly; she has not had previous genetic workup and minimal family is known due to being adopted. Gabriela first experienced symptoms of ADHD and was treated for this in 2nd grade. At age 14, family noted prodromal symptoms of hyper-fixation on specific movies, TV shows, and other activities. At age 14 while at a [place]  [place] became unresponsive and \"catatonic\" prompting emergent transportation to the hospital and prolonged 2 month hospitalization. Multiple trials of medications during this period with minimal efficacy. Eventually given a trial of clozapine with marked improvement in symptoms. Of note, after this period retained little to no memory of life events prior to age 7, with the exception of some sign language she had learned earlier. Additionally, during this period she lost the ability to perform basic activities she had been able to do previously, including tying her shoes, cooking, and other activities. ECT not used to treat that catatonic episode. More recently there had been concerns for cognitive declines and possible concerns for dementia, but an increase in Keppra may also have been a factor. Has not ever had genetic workup for microcephaly, seizure disorder, and psychiatric concerns. Previously on high doses of clozapine augmented with lithium. Lithium tapered and discontinued in the setting of Cr elevation with no notable destabilization with discontinuation. Clozapine had also been reduced previously due to elevated drug levels on monitoring.    Pertinent items include: psychosis , all , mutiple psychotropic trials , and major medical problems     Subjective     Mom, legal guardian, reports worsening in her symptoms as she gets more agitated, doing more " "hoarding behavior, and sleeps less.    She reports having more swallowing behavior which is not clear whether it is at stereotyped behavior or an increase in saliva production.    Prune juice has been \"working like a charm\". Having bowel movements regularly with no concerns for constipation at this time per Gabriela's report.    Has been cleaning a lot and re-watching [employer] right now. Had her 43th birthday on [birth year] and got a nice present from her Mom, which they talked about during today's appointment.     No reported side effects of medication including muscle stiffness, drooling, or involuntary movements.    Current Social History:  Financial/occupational: SSDI, family support  Living situation: Lives with 3 roommates and 2 staff members at [facility] since age 20  Social/spiritual support: Family, [place] staff    Pertinent Substance Use:   Alcohol: No   Cannabis: No  Tobacco: No  Caffeine:  Yes - coffee daily     Medical Review of Systems:   Will check in about medical concerns at next visit    Contraception: No     Mental Status Exam     Alertness: alert  and slow to respond at times  Appearance: adequately groomed - microcephalic  Behavior/Demeanor: cooperative, pleasant, and calm, with  intermittent  eye contact   Speech: increased latency of response and followed by period of more rapid speech  Language: intact  Psychomotor: normal or unremarkable  Mood: description consistent with euthymia  Affect: blunted but followed with more reactive affect when actively engaged in the conversation; congruent to: mood- yes, content- yes  Thought Process/Associations: unremarkable  Thought Content:  Reports none;  Denies suicidal & violent ideation and delusions  Perception:  Reports none;  Denies hallucinations  Insight: adequate with support from family, limited by known developmental delays  Judgment: adequate for safety  Cognition:  Known pervasive developmental delay  Gait and Station: N/A (telehealth)     Past " "Psych Med Trials      Medication Max Dose (mg) Dates / Duration Helpful? DC Reason / Adverse Effects?   clozapine 600mg Age 14 - current Y Dose slowly scaled back due to high serum levels   lithium 300mg Until 06/2023 Y Stopped due to elevated Cr on labs     Mother reports \"MANY different medications\" at time of first episode of psychosis before eventually settling on clozapine, which she has been on since that time.     Vitals   There were no vitals taken for this visit.  Pulse Readings from Last 3 Encounters:   11/22/23 98   10/11/23 105   04/24/19 95     Wt Readings from Last 3 Encounters:   11/22/23 63.4 kg (139 lb 12.8 oz)   10/11/23 63.2 kg (139 lb 6.4 oz)   04/24/19 70.4 kg (155 lb 4.8 oz)     BP Readings from Last 3 Encounters:   11/22/23 123/80   10/11/23 125/80   04/24/19 141/85        Medical History     ALLERGIES: Patient has no known allergies.    Patient Active Problem List   Diagnosis    Seizures (H)        Medications     Current Outpatient Medications   Medication Sig Dispense Refill    bisacodyl (DULCOLAX) 10 MG suppository Place 1 suppository (10 mg) rectally daily as needed for constipation - to be used if 3 days without a bowel movement 30 suppository 2    calcium carbonate (OS-SHARMIN 500 MG [place]) 500 MG tablet Take 500 mg by mouth daily      cloZAPine (CLOZARIL) 100 MG tablet Take 1 tablet (100 mg) by mouth 2 times daily 60 tablet 2    Cyanocobalamin (B-12 PO) Take by mouth daily (Patient not taking: Reported on 2/21/2024)      ferrous sulfate (FEROSUL) 325 (65 Fe) MG tablet Take 325 mg by mouth daily (with breakfast)      Fluticasone Propionate, Inhal, (FLOVENT IN) Inhale 1 puff into the lungs 2 times daily      levETIRAcetam (KEPPRA) 1000 MG tablet Take 1 tablet (1,000 mg) by mouth every evening 90 tablet 3    levETIRAcetam (KEPPRA) 750 MG tablet Take 1 tablet (750 mg) by mouth every morning 90 tablet 3    magnesium oxide 400 MG CAPS Take 1 capsule by mouth daily (Patient not taking: " Reported on 2/21/2024)      pantoprazole (PROTONIX) 40 MG EC tablet Take 1 tablet by mouth daily before breakfast      polyethylene glycol (MIRALAX) 17 GM/Dose powder Take 17-34 g (1-2 Capfuls) by mouth daily 850 g 2        Labs and Data         10/11/2023     1:56 PM   PROMIS-10 Total Score w/o Sub Scores   PROMIS TOTAL - SUBSCORES 31         10/11/2023     1:56 PM   CAGE-AID Total Score   Total Score 0   Total Score MyChart 0 (A total score of 2 or greater is considered clinically significant)         10/11/2023     1:36 PM 12/20/2023     1:04 PM   PHQ-9 SCORE   PHQ-9 Total Score MyChart 0 3 (Minimal depression)   PHQ-9 Total Score 0 3          No data to display                Liver/Kidney Function, TSH Metabolic Blood counts   No lab results found.  No lab results found. No lab results found.  No lab results found.  No lab results found. Recent Labs   Lab Test 03/22/24  0000   WBC 3.8   HGB 11.7   HCT 35.3   MCV 94.7           PROVIDER: Ashly Nelson MD    Level of Medical Decision Making:   - At least 1 chronic problem that is not stable  - Engaged in prescription drug management during visit (discussed any medication benefits, side effects, alternatives, etc.)    Patient staffed in clinic with Dr. Ogden who will sign the note.  Supervisor is Dr. Simno.     hide

## 2025-05-27 DIAGNOSIS — F25.0 SCHIZOAFFECTIVE DISORDER, BIPOLAR TYPE (H): ICD-10-CM

## 2025-05-29 ENCOUNTER — MYC REFILL (OUTPATIENT)
Dept: PSYCHIATRY | Facility: CLINIC | Age: 45
End: 2025-05-29
Payer: MEDICARE

## 2025-05-29 ENCOUNTER — TELEPHONE (OUTPATIENT)
Dept: PSYCHIATRY | Facility: CLINIC | Age: 45
End: 2025-05-29
Payer: MEDICARE

## 2025-05-29 DIAGNOSIS — F25.0 SCHIZOAFFECTIVE DISORDER, BIPOLAR TYPE (H): ICD-10-CM

## 2025-05-29 RX ORDER — CLOZAPINE 100 MG/1
TABLET ORAL
Qty: 105 TABLET | Refills: 2 | Status: SHIPPED | OUTPATIENT
Start: 2025-05-29

## 2025-05-29 NOTE — TELEPHONE ENCOUNTER
May 29, 2025 11:16 AM  Called [facility] at [phone] ext: [phone].   stated they have standing order on file and Gabriela hasn't presented to their location since 5/1.    May 29, 2025 11:23 AM  Returned call to [facility] at [phone]. She said the house mother had said she called the lab before hand and they said she didn't have orders. Relayed above update/information from when writer called the lab directly. She was understanding of this and will relay to house mother.   Writer also provided education on cessation of REMS, which was new information for her. Instructed to still obtain monthly labs, but it does not need to be timed with refills of medication.    Makenna Georges RN on 5/29/2025 at 11:28 AM

## 2025-05-29 NOTE — TELEPHONE ENCOUNTER
Last refill per : NA  Last ANC: 3.8 on 5/1    Medication refill approved per refill protocol.     Makenna Georges RN on 5/29/2025 at 12:38 PM

## 2025-05-29 NOTE — TELEPHONE ENCOUNTER
Last seen: 3/13  RTC: 12 weeks   Any patient initiated cancellations or no shows since last visit? No  Next appt: 6/5  Last filled: 4/23 for 30 day supply     Incoming refill from patient via [facility] by patient or caregiver    Medication requested:   cloZAPine (CLOZARIL) 100 MG tablet     From chart note:   # Schizoaffective disorder, bipolar type: Gabriela experienced worsening of psychosis recently but is doing better today.  Most recent clozapine level was 427 (2/2025) which is in the therapeutic range. We continue clozapine at 350mg. There is room to increase the dose if needed.     Is note signed/closed? Yes    Is this a 90 day request for a psych medication? No    LPNs - Please check  if controlled and send to provider  Others - Send to RNs

## 2025-05-29 NOTE — TELEPHONE ENCOUNTER
Mom  S   work number    [facility] Call Center    Phone Message    May a detailed message be left on voicemail: yes     Reason for Call: Other: lab orders     Gustavo Zamora pt    House mother took pt to lab in [place] for blood draw, but lab says they still don't have orders. Mom calling, left her work number for today: [phone]   Action Taken: Message routed to:  Other: nursing pool    Travel Screening: Not Applicable

## 2025-06-04 ASSESSMENT — PATIENT HEALTH QUESTIONNAIRE - PHQ9
10. IF YOU CHECKED OFF ANY PROBLEMS, HOW DIFFICULT HAVE THESE PROBLEMS MADE IT FOR YOU TO DO YOUR WORK, TAKE CARE OF THINGS AT HOME, OR GET ALONG WITH OTHER PEOPLE: NOT DIFFICULT AT ALL
SUM OF ALL RESPONSES TO PHQ QUESTIONS 1-9: 0
SUM OF ALL RESPONSES TO PHQ QUESTIONS 1-9: 0

## 2025-06-05 ENCOUNTER — VIRTUAL VISIT (OUTPATIENT)
Dept: PSYCHIATRY | Facility: CLINIC | Age: 45
End: 2025-06-05
Attending: PSYCHIATRY & NEUROLOGY
Payer: MEDICARE

## 2025-06-05 NOTE — NURSING NOTE
Current patient location: [address] [place] [address] [address] [facility] [id number]    Is the patient currently in the state of [place]? YES    Visit mode: VIDEO    If the visit is dropped, the patient can be reconnected by:VIDEO VISIT: Send to e-mail at: [email or web address]    Will anyone else be joining the visit? [place],  staff  (If patient encounters technical issues they should call [phone]    Are changes needed to the allergy or medication list? No    Are refills needed on medications prescribed by this physician? NO    Rooming Documentation:  Questionnaire(s) completed    Reason for visit: RECHECK    Rosio CLINEF

## 2025-06-05 NOTE — PROGRESS NOTES
Virtual Visit Details  Type of service:  Video Visit   Start Time: 09:45 AM  End Time: 10:10 AM  Originating Location (pt. Location): Home  Distant Location (provider location):  On-site  Platform used for Video Visit: [facility]  MEDICAL PROGRESS NOTE       CARE TEAM:    PCP- Mary Soliman with [facility]  Therapist- None  Neuro-  [place]    Gabriela is a 44 year old who uses the pronouns she, her, hers.   Mother Slime is legal guardian and routinely attends appointments.                 Assessment & Plan      # Schizoaffective disorder, bipolar type: Gabriela experienced worsening of psychosis recently but has been doing stable since increasing Clozapine to 350 with no delusion, aggression, stable sleep with good mood.  Most recent clozapine level was 427 (2/2025) which is in the therapeutic range. We continue clozapine at 350mg. There is room to increase the dose if needed.     - Continue clozapine 100mg QAM and 250mg at bedtime     Labs- *Must fax lab orders to [facility] Ian Morales - [phone]*  - Monthly CBC/ANC clozapine monitoring done via [facility]  - Annual SGA monitoring includes CMP, A1c, fasting lipids., Vitals : will see PCP soon and update us   - May consider whole exome testing for genetic variants associated with neuro developmental delay, seizures, ASD, psychosis, or catatonia in the future if noting deterioration in function    Rating scales- AIMS due: currently     # Pervasive developmental delay/microcephaly/seizure disorder: Per hx, Gabriela has not had genetic screening done in the past. Given her medical and psychiatric history, including abrupt onset of catatonic symptoms at age 14, the suspicion for possible genetic syndrome is elevated. Having additional information on this wouldn't likely change the current course of care, but could be beneficial in case of worsening symptoms in the future.   -  "Neuro managed levetiracetam (Keppra) for seizures     # Constipation 2/2 clozapine: Constipation improved with addition of prune juice and she is now having bowel movements regularly. No changes to constipation regimen at this time.  - Continue daily glass of prune juice for constipation  - Continue Miralax 1-2 capfuls daily   - Continue bisacodyl (Dulcolax) 10mg suppository as needed if no bowel movements for 3 days    Disposition: Return to clinic in 12 weeks.     _______________________________________________________________________________  Pertinent background  (From Dr. Nelson's note 5/22/2024)    Gabriela has a known medical history of developmental delay and microcephaly; she has not had previous genetic workup and minimal family is known due to being adopted. Gabriela first experienced symptoms of ADHD and was treated for this in 2nd grade. At age 14, family noted prodromal symptoms of hyper-fixation on specific movies, TV shows, and other activities. At age 14 while at a [place]  [place] became unresponsive and \"catatonic\" prompting emergent transportation to the hospital and prolonged 2 month hospitalization. Multiple trials of medications during this period with minimal efficacy. Eventually given a trial of clozapine with marked improvement in symptoms. Of note, after this period retained little to no memory of life events prior to age 7, with the exception of some sign language she had learned earlier. Additionally, during this period she lost the ability to perform basic activities she had been able to do previously, including tying her shoes, cooking, and other activities. ECT not used to treat that catatonic episode. More recently there had been concerns for cognitive declines and possible concerns for dementia, but an increase in Keppra may also have been a factor. Has not ever had genetic workup for microcephaly, seizure disorder, and psychiatric concerns. Previously on high doses of clozapine augmented " with lithium. Lithium tapered and discontinued in the setting of Cr elevation with no notable destabilization with discontinuation. Clozapine had also been reduced previously due to elevated drug levels on monitoring.     Pertinent items include: psychosis , all , mutiple psychotropic trials , and major medical problems                 Interval History   Since last visit:  - Met Gabriela with [facility] and group home staff Jazmin.  - Gabriela, Slime, and  staff states Gabriela has been doing very well  - Mood has been good. No overt paranoia, delusion, AH/VH, SI, no aggression, irritability  - Sleep has been good/regular  - Increased Clozapine has been very helpful, has been having regular bowel movement. No other side effects  - Agreed to meet PCP and check labs/vitals. Will updates us once they met the PCP  - Feels safe at her current place  - Wants to continue current medication without change    Social History Update:  Financial/occupational: SSDI, family support  Living situation (partner, children, pets, etc): Lives with 3 roommates and 2 staff members at [facility] since age 20     Pertinent Substance Use:   Alcohol: No   Cannabis: No  Tobacco: No  Caffeine:  Drinks coffee daily                   Physical Exam  (Vitals Only)    There were no vitals taken for this visit.  Pulse Readings from Last 3 Encounters:   11/22/23 98   10/11/23 105   04/24/19 95     Wt Readings from Last 3 Encounters:   03/13/25 62.6 kg (138 lb)   02/13/25 62.1 kg (137 lb)   11/22/23 63.4 kg (139 lb 12.8 oz)     BP Readings from Last 3 Encounters:   11/22/23 123/80   10/11/23 125/80   04/24/19 141/85                  Mental Status Exam    Alertness: alert and slow to respond at times  Appearance: adequately groomed - microcephalic  Behavior/Demeanor: cooperative, pleasant, and calm, with  intermittent eye contact   Speech: increased latency of response and followed by period of more rapid speech  Language: intact  Psychomotor:  Intermittent  "movements of feet, appear unnoticed by patient  Mood: description consistent with euthymia  Affect: restricted but followed with more reactive affect when actively engaged in the conversation; congruent to: mood- yes, content- yes  Thought Process/Associations: unremarkable  Thought Content:  Reports none;  Denies suicidal & violent ideation and delusions  Perception:  Reports none;  Denies hallucinations  Insight: adequate with support from family, limited by known developmental delays  Judgment: adequate for safety  Cognition:  Known pervasive developmental delay  Gait and Station:  N/A                  Past Psychiatric History - Summary points of care since 07/2024    Past Psychotropic Medication Trials   Medication Max Dose (mg) Dates / Duration Helpful? DC Reason / Adverse Effects?   clozapine 600 Age 14 Y Slowly scaled back due to high serum levels   lithium 300 Until 6/2023 Y Stopped due to elevated Cr on labs     Mother reports \"MANY different medications\" at time of first episode of psychosis before eventually settling on clozapine, which she has been on since that time.                   Past Medical History     Patient Active Problem List   Diagnosis    Seizure disorder (H)    Gastroesophageal reflux disease    Hypertriglyceridemia    Schizoaffective disorder, bipolar type (H)    Pervasive developmental disorder    Drug-induced constipation    Moderate persistent asthma    Microcephaly (H)                   Medications     Current Outpatient Medications   Medication Sig Dispense Refill    acetaminophen (TYLENOL) 500 MG tablet Take 500-1,000 mg by mouth every 6 hours as needed for pain (headache).      albuterol (PROAIR HFA/PROVENTIL HFA/VENTOLIN HFA) 108 (90 Base) MCG/ACT inhaler Inhale 1-2 puffs into the lungs      ARNUITY ELLIPTA 50 MCG/ACT inhaler       bisacodyl (DULCOLAX) 10 MG suppository Place 1 suppository (10 mg) rectally daily as needed for constipation - to be used if 3 days without a bowel " movement 30 suppository 2    calcium carbonate-vitamin D (CALTRATE) 600-10 MG-MCG per tablet Take 1 tablet by mouth every morning.      cloZAPine (CLOZARIL) 100 MG tablet Take 1 tablet (100 mg) by mouth every morning AND 2.5 tablets (250 mg) at bedtime. 105 tablet 2    ferrous sulfate (FEROSUL) 325 (65 Fe) MG tablet Take 325 mg by mouth daily (with breakfast)      Fluticasone Propionate, Inhal, (FLOVENT IN) Inhale 1 puff into the lungs 2 times daily      levETIRAcetam (KEPPRA) 1000 MG tablet Take 1 tablet (1,000 mg) by mouth every morning. 90 tablet 3    levETIRAcetam (KEPPRA) 750 MG tablet Take 1 tablet (750 mg) by mouth every evening. 90 tablet 3    magnesium oxide (MAG-OX) 400 MG tablet Take 400 mg by mouth daily.      pantoprazole (PROTONIX) 40 MG EC tablet Take 1 tablet by mouth daily before breakfast      polyethylene glycol (MIRALAX) 17 GM/Dose powder Take 17-34 g (1-2 Capfuls) by mouth daily. 850 g 3    prune juice LIQD Take by mouth daily.                   Data         10/7/2024    10:48 AM 1/3/2025    12:09 PM 6/2/2025    10:19 AM   PROMIS-10 Total Score w/o Sub Scores   PROMIS TOTAL - SUBSCORES 29 25  26        Proxy-reported         1/7/2025    11:25 AM 2/12/2025    10:43 AM 6/4/2025    10:08 AM   PHQ-9 SCORE   PHQ-9 Total Score MyChart 0  5 (Mild depression)  0    PHQ-9 Total Score 0  5  0        Proxy-reported       Recent Labs   Lab Test 03/22/24  0000 02/20/24  0000 01/22/24  1406   WBC 3.8 4.7 5.2   HGB 11.7 11.3 11.3     Recent Labs   Lab Test 02/18/25  0807 05/14/24  0832   CLOZQT 427 258*     Recent Labs   Lab Test 05/14/24  0832   CLOZMT 502   combined therapeutic range: 450-500 ng/mL;  caution adverse effects 800-1200 ng/mL     ECG 5/3/24 QTc = 417ms      PROVIDER: Gustavo Zamora MD  This patient was staffed with Dr. Ramirez who agrees with my assessment and plan    Level of Medical Decision Making:   - At least 2 stable chronic problems  - Engaged in prescription drug management during visit  (discussed any medication benefits, side effects, alternatives, etc.)     The longitudinal plan of care for the diagnosis(es)/condition(s) as documented were addressed during this visit. Due to the added complexity in care, I will continue to support Gabriela in the subsequent management and with ongoing continuity of care.    Answers submitted by the patient for this visit:  Patient Health Questionnaire (Submitted on 6/4/2025)  If you checked off any problems, how difficult have these problems made it for you to do your work, take care of things at home, or get along with other people?: Not difficult at all  PHQ9 TOTAL SCORE: 0

## 2025-07-21 DIAGNOSIS — F25.0 SCHIZOAFFECTIVE DISORDER, BIPOLAR TYPE (H): ICD-10-CM

## 2025-07-24 RX ORDER — CLOZAPINE 100 MG/1
TABLET ORAL
Qty: 105 TABLET | Refills: 2 | OUTPATIENT
Start: 2025-07-24

## 2025-07-24 NOTE — TELEPHONE ENCOUNTER
refill on file  cloZAPine (CLOZARIL) 100 MG tablet   105 tablet 2 5/29/2025 -- No  Sig - Route: Take 1 tablet (100 mg) by mouth every morning AND 2.5 tablets (250 mg) at bedtime. - Oral

## 2025-08-25 RX ORDER — CLOZAPINE 100 MG/1
TABLET ORAL
Qty: 105 TABLET | OUTPATIENT
Start: 2025-08-25